# Patient Record
Sex: FEMALE | Race: BLACK OR AFRICAN AMERICAN | NOT HISPANIC OR LATINO | Employment: FULL TIME | URBAN - METROPOLITAN AREA
[De-identification: names, ages, dates, MRNs, and addresses within clinical notes are randomized per-mention and may not be internally consistent; named-entity substitution may affect disease eponyms.]

---

## 2017-01-14 ENCOUNTER — HOSPITAL ENCOUNTER (EMERGENCY)
Facility: HOSPITAL | Age: 22
Discharge: HOME | End: 2017-01-15
Attending: EMERGENCY MEDICINE | Admitting: EMERGENCY MEDICINE

## 2017-01-14 DIAGNOSIS — N83.209 RUPTURED OVARIAN CYST: Primary | ICD-10-CM

## 2017-01-14 LAB
ALBUMIN SERPL-MCNC: 3.7 G/DL (ref 3.5–5.2)
ALBUMIN/GLOB SERPL: 1.1 G/DL
ALP SERPL-CCNC: 53 U/L (ref 39–117)
ALT SERPL W P-5'-P-CCNC: 9 U/L (ref 1–33)
ANION GAP SERPL CALCULATED.3IONS-SCNC: 11.4 MMOL/L
AST SERPL-CCNC: 15 U/L (ref 1–32)
BACTERIA UR QL AUTO: ABNORMAL /HPF
BASOPHILS # BLD AUTO: 0.03 10*3/MM3 (ref 0–0.2)
BASOPHILS NFR BLD AUTO: 0.4 % (ref 0–1.5)
BILIRUB SERPL-MCNC: 0.4 MG/DL (ref 0.1–1.2)
BILIRUB UR QL STRIP: NEGATIVE
BUN BLD-MCNC: 11 MG/DL (ref 6–20)
BUN/CREAT SERPL: 11.8 (ref 7–25)
CALCIUM SPEC-SCNC: 8.7 MG/DL (ref 8.6–10.5)
CHLORIDE SERPL-SCNC: 101 MMOL/L (ref 98–107)
CLARITY UR: ABNORMAL
CO2 SERPL-SCNC: 23.6 MMOL/L (ref 22–29)
COLOR UR: YELLOW
CREAT BLD-MCNC: 0.93 MG/DL (ref 0.57–1)
DEPRECATED RDW RBC AUTO: 41.4 FL (ref 37–54)
EOSINOPHIL # BLD AUTO: 0.05 10*3/MM3 (ref 0–0.7)
EOSINOPHIL NFR BLD AUTO: 0.7 % (ref 0.3–6.2)
ERYTHROCYTE [DISTWIDTH] IN BLOOD BY AUTOMATED COUNT: 11.9 % (ref 11.7–13)
GFR SERPL CREATININE-BSD FRML MDRD: 91 ML/MIN/1.73
GLOBULIN UR ELPH-MCNC: 3.3 GM/DL
GLUCOSE BLD-MCNC: 92 MG/DL (ref 65–99)
GLUCOSE UR STRIP-MCNC: NEGATIVE MG/DL
HCG SERPL QL: NEGATIVE
HCT VFR BLD AUTO: 36.1 % (ref 35.6–45.5)
HGB BLD-MCNC: 11.9 G/DL (ref 11.9–15.5)
HGB UR QL STRIP.AUTO: NEGATIVE
HYALINE CASTS UR QL AUTO: ABNORMAL /LPF
IMM GRANULOCYTES # BLD: 0 10*3/MM3 (ref 0–0.03)
IMM GRANULOCYTES NFR BLD: 0 % (ref 0–0.5)
KETONES UR QL STRIP: NEGATIVE
LEUKOCYTE ESTERASE UR QL STRIP.AUTO: NEGATIVE
LIPASE SERPL-CCNC: 18 U/L (ref 13–60)
LYMPHOCYTES # BLD AUTO: 2.1 10*3/MM3 (ref 0.9–4.8)
LYMPHOCYTES NFR BLD AUTO: 28.6 % (ref 19.6–45.3)
MCH RBC QN AUTO: 31.6 PG (ref 26.9–32)
MCHC RBC AUTO-ENTMCNC: 33 G/DL (ref 32.4–36.3)
MCV RBC AUTO: 96 FL (ref 80.5–98.2)
MONOCYTES # BLD AUTO: 0.5 10*3/MM3 (ref 0.2–1.2)
MONOCYTES NFR BLD AUTO: 6.8 % (ref 5–12)
NEUTROPHILS # BLD AUTO: 4.67 10*3/MM3 (ref 1.9–8.1)
NEUTROPHILS NFR BLD AUTO: 63.5 % (ref 42.7–76)
NITRITE UR QL STRIP: NEGATIVE
PH UR STRIP.AUTO: 6.5 [PH] (ref 5–8)
PLATELET # BLD AUTO: 307 10*3/MM3 (ref 140–500)
PMV BLD AUTO: 9.5 FL (ref 6–12)
POTASSIUM BLD-SCNC: 3.5 MMOL/L (ref 3.5–5.2)
PROT SERPL-MCNC: 7 G/DL (ref 6–8.5)
PROT UR QL STRIP: ABNORMAL
RBC # BLD AUTO: 3.76 10*6/MM3 (ref 3.9–5.2)
RBC # UR: ABNORMAL /HPF
REF LAB TEST METHOD: ABNORMAL
SODIUM BLD-SCNC: 136 MMOL/L (ref 136–145)
SP GR UR STRIP: >=1.03 (ref 1–1.03)
SQUAMOUS #/AREA URNS HPF: ABNORMAL /HPF
UROBILINOGEN UR QL STRIP: ABNORMAL
WBC NRBC COR # BLD: 7.35 10*3/MM3 (ref 4.5–10.7)
WBC UR QL AUTO: ABNORMAL /HPF

## 2017-01-14 PROCEDURE — 99283 EMERGENCY DEPT VISIT LOW MDM: CPT

## 2017-01-14 PROCEDURE — 25010000002 ONDANSETRON PER 1 MG: Performed by: EMERGENCY MEDICINE

## 2017-01-14 PROCEDURE — 80053 COMPREHEN METABOLIC PANEL: CPT | Performed by: EMERGENCY MEDICINE

## 2017-01-14 PROCEDURE — 84703 CHORIONIC GONADOTROPIN ASSAY: CPT | Performed by: EMERGENCY MEDICINE

## 2017-01-14 PROCEDURE — 87086 URINE CULTURE/COLONY COUNT: CPT | Performed by: EMERGENCY MEDICINE

## 2017-01-14 PROCEDURE — 81001 URINALYSIS AUTO W/SCOPE: CPT | Performed by: EMERGENCY MEDICINE

## 2017-01-14 PROCEDURE — 96374 THER/PROPH/DIAG INJ IV PUSH: CPT

## 2017-01-14 PROCEDURE — 25010000002 MORPHINE PER 10 MG: Performed by: EMERGENCY MEDICINE

## 2017-01-14 PROCEDURE — 83690 ASSAY OF LIPASE: CPT | Performed by: EMERGENCY MEDICINE

## 2017-01-14 PROCEDURE — 96375 TX/PRO/DX INJ NEW DRUG ADDON: CPT

## 2017-01-14 PROCEDURE — 85025 COMPLETE CBC W/AUTO DIFF WBC: CPT | Performed by: EMERGENCY MEDICINE

## 2017-01-14 RX ORDER — ONDANSETRON 2 MG/ML
4 INJECTION INTRAMUSCULAR; INTRAVENOUS ONCE
Status: COMPLETED | OUTPATIENT
Start: 2017-01-14 | End: 2017-01-14

## 2017-01-14 RX ORDER — SODIUM CHLORIDE 0.9 % (FLUSH) 0.9 %
10 SYRINGE (ML) INJECTION AS NEEDED
Status: DISCONTINUED | OUTPATIENT
Start: 2017-01-14 | End: 2017-01-15 | Stop reason: HOSPADM

## 2017-01-14 RX ADMIN — ONDANSETRON 4 MG: 2 INJECTION INTRAMUSCULAR; INTRAVENOUS at 23:51

## 2017-01-14 RX ADMIN — MORPHINE SULFATE 4 MG: 4 INJECTION, SOLUTION INTRAMUSCULAR; INTRAVENOUS at 23:51

## 2017-01-15 ENCOUNTER — APPOINTMENT (OUTPATIENT)
Dept: CT IMAGING | Facility: HOSPITAL | Age: 22
End: 2017-01-15

## 2017-01-15 VITALS
HEIGHT: 71 IN | BODY MASS INDEX: 25.2 KG/M2 | WEIGHT: 180 LBS | TEMPERATURE: 97 F | RESPIRATION RATE: 16 BRPM | DIASTOLIC BLOOD PRESSURE: 73 MMHG | HEART RATE: 85 BPM | OXYGEN SATURATION: 100 % | SYSTOLIC BLOOD PRESSURE: 115 MMHG

## 2017-01-15 LAB
HOLD SPECIMEN: NORMAL
WHOLE BLOOD HOLD SPECIMEN: NORMAL
WHOLE BLOOD HOLD SPECIMEN: NORMAL

## 2017-01-15 PROCEDURE — 0 IOPAMIDOL 61 % SOLUTION: Performed by: EMERGENCY MEDICINE

## 2017-01-15 PROCEDURE — 74177 CT ABD & PELVIS W/CONTRAST: CPT

## 2017-01-15 RX ORDER — HYDROCODONE BITARTRATE AND ACETAMINOPHEN 5; 325 MG/1; MG/1
1 TABLET ORAL EVERY 6 HOURS PRN
Qty: 15 TABLET | Refills: 0 | Status: SHIPPED | OUTPATIENT
Start: 2017-01-15 | End: 2017-03-01

## 2017-01-15 RX ADMIN — IOPAMIDOL 85 ML: 612 INJECTION, SOLUTION INTRAVENOUS at 00:19

## 2017-01-15 NOTE — ED PROVIDER NOTES
EMERGENCY DEPARTMENT ENCOUNTER    CHIEF COMPLAINT  Chief Complaint: Abd pain  History given by: Pt  History limited by: N/A  Room Number: 23/23  PMD: No Known Provider      HPI:  Pt is a 22 y.o. female who presents complaining of severe RLQ abd pain onset 5:00 PM today. She rates her pain as 8/10 currently. Pt also c/o nausea. Pt denies vomiting, diarrhea, fever, and recent ill contacts. Her LMP ended 2 weeks ago, which was described as normal. She reports no prior abd surgeries.    Duration: 6 hours  Onset: Gradual  Timing: Constant  Location: RLQ abd  Radiation: None  Quality: Aching  Intensity/Severity: Severe  Progression: Unchanged  Associated Symptoms: Nausea  Aggravating Factors: Nothing  Alleviating Factors: Nothing  Previous Episodes: None specified  Treatment before arrival: None specified    PAST MEDICAL HISTORY  Active Ambulatory Problems     Diagnosis Date Noted   • No Active Ambulatory Problems     Resolved Ambulatory Problems     Diagnosis Date Noted   • No Resolved Ambulatory Problems     No Additional Past Medical History       PAST SURGICAL HISTORY  History reviewed. No pertinent past surgical history.    FAMILY HISTORY  History reviewed. No pertinent family history.    SOCIAL HISTORY  Social History     Social History   • Marital status: Single     Spouse name: N/A   • Number of children: N/A   • Years of education: N/A     Occupational History   • Not on file.     Social History Main Topics   • Smoking status: Never Smoker   • Smokeless tobacco: Not on file   • Alcohol use Yes      Comment: socially   • Drug use: Yes     Special: Marijuana   • Sexual activity: Not on file     Other Topics Concern   • Not on file     Social History Narrative   • No narrative on file       ALLERGIES  Review of patient's allergies indicates no known allergies.    REVIEW OF SYSTEMS  Review of Systems   Constitutional: Negative for chills and fever.   HENT: Negative for sore throat and trouble swallowing.    Eyes:  Negative for visual disturbance.   Respiratory: Negative for cough and shortness of breath.    Cardiovascular: Negative for chest pain, palpitations and leg swelling.   Gastrointestinal: Positive for abdominal pain and nausea. Negative for diarrhea and vomiting.   Endocrine: Negative.    Genitourinary: Negative for decreased urine volume, dysuria and frequency.   Musculoskeletal: Negative for neck pain.   Skin: Negative for rash.   Allergic/Immunologic: Negative.    Neurological: Negative for syncope, weakness, numbness and headaches.   Hematological: Negative.    Psychiatric/Behavioral: Negative.    All other systems reviewed and are negative.      PHYSICAL EXAM  ED Triage Vitals   Temp Heart Rate Resp BP SpO2   01/14/17 2230 01/14/17 2230 01/14/17 2230 01/14/17 2235 01/14/17 2230   98.5 °F (36.9 °C) 108 16 133/79 98 %      Temp src Heart Rate Source Patient Position BP Location FiO2 (%)   01/14/17 2230 01/14/17 2230 -- -- --   Tympanic Monitor          Physical Exam   Constitutional: She is oriented to person, place, and time and well-developed, well-nourished, and in no distress. No distress.   HENT:   Head: Normocephalic and atraumatic.   Eyes: EOM are normal. Pupils are equal, round, and reactive to light.   Neck: Normal range of motion. Neck supple.   Cardiovascular: Normal rate, regular rhythm and normal heart sounds.    Pulmonary/Chest: Effort normal and breath sounds normal. No respiratory distress.   Abdominal: Soft. She exhibits no mass. There is tenderness (moderate) in the right lower quadrant. There is no rebound and no guarding.   Musculoskeletal: Normal range of motion. She exhibits no edema.   Pt's extremities without deformity   Neurological: She is alert and oriented to person, place, and time. She has normal sensation and normal strength.   Normal neuro exam   Skin: Skin is warm and dry. No rash noted.   Psychiatric: Mood and affect normal.   Nursing note and vitals reviewed.      LAB  RESULTS  Lab Results (last 24 hours)     Procedure Component Value Units Date/Time    Urinalysis With / Culture If Indicated [[id number]]  (Abnormal) Collected:  01/14/17 2241    Specimen:  Urine from Urine, Clean Catch Updated:  01/14/17 2250     Color, UA Yellow      Appearance, UA Cloudy (A)      pH, UA 6.5      Specific Gravity, UA >=1.030      Glucose, UA Negative      Ketones, UA Negative      Bilirubin, UA Negative      Blood, UA Negative      Protein, UA 30 mg/dL (1+) (A)      Leuk Esterase, UA Negative      Nitrite, UA Negative      Urobilinogen, UA 0.2 E.U./dL     Urinalysis, Microscopic Only [[id number]]  (Abnormal) Collected:  01/14/17 2241    Specimen:  Urine from Urine, Clean Catch Updated:  01/14/17 2300     RBC, UA 0-2 (A) /HPF      WBC, UA 13-20 (A) /HPF      Bacteria, UA 4+ (A) /HPF      Squamous Epithelial Cells, UA Too Numerous to Count (A) /HPF      Hyaline Casts, UA 0-2 /LPF      Methodology Manual Light Microscopy     Urine Culture [[id number]] Collected:  01/14/17 2241    Specimen:  Urine from Urine, Clean Catch Updated:  01/14/17 2254    CBC & Differential [[id number]] Collected:  01/14/17 2311    Specimen:  Blood Updated:  01/14/17 2330    Narrative:       The following orders were created for panel order CBC & Differential.  Procedure                               Abnormality         Status                     ---------                               -----------         ------                     CBC Auto Differential[[id number]]         Abnormal            Final result                 Please view results for these tests on the individual orders.    Comprehensive Metabolic Panel [[id number]] Collected:  01/14/17 2311    Specimen:  Blood Updated:  01/14/17 2354     Glucose 92 mg/dL      BUN 11 mg/dL      Creatinine 0.93 mg/dL      Sodium 136 mmol/L      Potassium 3.5 mmol/L      Chloride 101 mmol/L      CO2 23.6 mmol/L      Calcium 8.7 mg/dL      Total Protein 7.0 g/dL      Albumin 3.70 g/dL      ALT  (SGPT) 9 U/L      AST (SGOT) 15 U/L      Alkaline Phosphatase 53 U/L      Total Bilirubin 0.4 mg/dL      eGFR  African Amer 91 mL/min/1.73      Globulin 3.3 gm/dL      A/G Ratio 1.1 g/dL      BUN/Creatinine Ratio 11.8      Anion Gap 11.4 mmol/L     Lipase [[id number]]  (Normal) Collected:  01/14/17 2311    Specimen:  Blood Updated:  01/14/17 2354     Lipase 18 U/L     hCG, Serum, Qualitative [[id number]]  (Normal) Collected:  01/14/17 2311    Specimen:  Blood Updated:  01/14/17 2349     HCG Qualitative Negative     CBC Auto Differential [[id number]]  (Abnormal) Collected:  01/14/17 2311    Specimen:  Blood Updated:  01/14/17 2330     WBC 7.35 10*3/mm3      RBC 3.76 (L) 10*6/mm3      Hemoglobin 11.9 g/dL      Hematocrit 36.1 %      MCV 96.0 fL      MCH 31.6 pg      MCHC 33.0 g/dL      RDW 11.9 %      RDW-SD 41.4 fl      MPV 9.5 fL      Platelets 307 10*3/mm3      Neutrophil % 63.5 %      Lymphocyte % 28.6 %      Monocyte % 6.8 %      Eosinophil % 0.7 %      Basophil % 0.4 %      Immature Grans % 0.0 %      Neutrophils, Absolute 4.67 10*3/mm3      Lymphocytes, Absolute 2.10 10*3/mm3      Monocytes, Absolute 0.50 10*3/mm3      Eosinophils, Absolute 0.05 10*3/mm3      Basophils, Absolute 0.03 10*3/mm3      Immature Grans, Absolute 0.00 10*3/mm3           I ordered the above labs and reviewed the results    RADIOLOGY  CT Abdomen Pelvis With Contrast    (Results Pending)   CT Abd Pelvis:  Small amount of free fluid in pelvis, normal appendix     I ordered the above noted radiological studies. Interpreted by radiologist. Discussed with radiologist (Dr. Haji). Reviewed by me in PACS.     PROCEDURES  Procedures      PROGRESS AND CONSULTS  ED Course   11:21 PM:  Vitals: BP: 127/79 HR: 90 Temp: 98.5 °F (36.9 °C) (Tympanic) O2 sat: 99%  D/w pt plan for labs and CT Abd Pelvis for further evaluation. Ordered Morphine and Zofran for pain and nausea. Pt understands and agrees with the plan, all questions answered.    12:41  AM:  Vitals: BP: 120/78 HR: 90 Temp: 98.5 °F (36.9 °C) (Tympanic) O2 sat: 100%  Rechecked pt. Pt is resting comfortably. Discussed results of imaging, which showed free fluid in the pelvis evident of a ruptured cyst, and the plan for discharge. Advised the pt to f/u with GYN for further care. Will provide the pt a prescription for Norco. Pt understands and agrees with the plan, all questions answered.    MEDICAL DECISION MAKING  Results were reviewed/discussed with the patient and they were also made aware of online access. Pt also made aware that some labs, such as cultures, will not be resulted during ER visit and follow up with PMD is necessary.     MDM  Number of Diagnoses or Management Options     Amount and/or Complexity of Data Reviewed  Clinical lab tests: ordered and reviewed  Tests in the radiology section of CPT®: ordered and reviewed  Discussion of test results with the performing providers: yes  Independent visualization of images, tracings, or specimens: yes    Patient Progress  Patient progress: stable         DIAGNOSIS  Final diagnoses:   Ruptured ovarian cyst       DISPOSITION  DISCHARGE    Patient discharged in stable condition.    Reviewed implications of results, diagnosis, meds, responsibility to follow up, warning signs and symptoms of possible worsening, potential complications and reasons to return to ER, including any new or worsening symptoms.    Patient/Family voiced understanding of above instructions.    Discussed plan for discharge, as there is no emergent indication for admission.  Pt/family is agreeable and understands need for follow up and repeat testing.  Pt is aware that discharge does not mean that nothing is wrong but it indicates no emergency is present that requires admission and they must continue care with follow-up as given below or physician of their choice.     FOLLOW-UP  Latonia Hernández MD  [address] [facility] [address] [phone]    Schedule an appointment as  soon as possible for a visit           Medication List      New Prescriptions          HYDROcodone-acetaminophen 5-325 MG per tablet   Commonly known as:  NORCO   Take 1 tablet by mouth Every 6 (Six) Hours As Needed for moderate pain   (4-6).           Latest Documented Vital Signs:  As of 12:43 AM  BP- 120/78 HR- 90 Temp- 98.5 °F (36.9 °C) (Tympanic) O2 sat- 100%    --  Documentation assistance provided by oneal Hoover for Dr. Gibbs.  Information recorded by the scribe was done at my direction and has been verified and validated by me.     Augie Hoover  01/15/17 0043       Christiano Gibbs MD  01/15/17 0114

## 2017-01-15 NOTE — ED NOTES
Pt reports RLQ pain since 1500 today, also reports diarrhea today.      Pt NPO solids and liquids 1700.     Nitza Bolton RN  01/14/17 8196

## 2017-01-16 LAB — BACTERIA SPEC AEROBE CULT: NORMAL

## 2017-03-01 ENCOUNTER — APPOINTMENT (OUTPATIENT)
Dept: PREADMISSION TESTING | Facility: HOSPITAL | Age: 22
End: 2017-03-01

## 2017-03-01 VITALS
RESPIRATION RATE: 20 BRPM | TEMPERATURE: 98.4 F | DIASTOLIC BLOOD PRESSURE: 89 MMHG | WEIGHT: 175 LBS | HEIGHT: 72 IN | OXYGEN SATURATION: 100 % | BODY MASS INDEX: 23.7 KG/M2 | HEART RATE: 67 BPM | SYSTOLIC BLOOD PRESSURE: 141 MMHG

## 2017-03-01 LAB
BASOPHILS # BLD AUTO: 0.04 10*3/MM3 (ref 0–0.2)
BASOPHILS NFR BLD AUTO: 1 % (ref 0–1.5)
DEPRECATED RDW RBC AUTO: 42.7 FL (ref 37–54)
EOSINOPHIL # BLD AUTO: 0.15 10*3/MM3 (ref 0–0.7)
EOSINOPHIL NFR BLD AUTO: 3.6 % (ref 0.3–6.2)
ERYTHROCYTE [DISTWIDTH] IN BLOOD BY AUTOMATED COUNT: 12.6 % (ref 11.7–13)
HCG SERPL QL: NEGATIVE
HCT VFR BLD AUTO: 36.5 % (ref 35.6–45.5)
HGB BLD-MCNC: 12.3 G/DL (ref 11.9–15.5)
IMM GRANULOCYTES # BLD: 0 10*3/MM3 (ref 0–0.03)
IMM GRANULOCYTES NFR BLD: 0 % (ref 0–0.5)
LYMPHOCYTES # BLD AUTO: 1.78 10*3/MM3 (ref 0.9–4.8)
LYMPHOCYTES NFR BLD AUTO: 42.8 % (ref 19.6–45.3)
MCH RBC QN AUTO: 31.8 PG (ref 26.9–32)
MCHC RBC AUTO-ENTMCNC: 33.7 G/DL (ref 32.4–36.3)
MCV RBC AUTO: 94.3 FL (ref 80.5–98.2)
MONOCYTES # BLD AUTO: 0.21 10*3/MM3 (ref 0.2–1.2)
MONOCYTES NFR BLD AUTO: 5 % (ref 5–12)
NEUTROPHILS # BLD AUTO: 1.98 10*3/MM3 (ref 1.9–8.1)
NEUTROPHILS NFR BLD AUTO: 47.6 % (ref 42.7–76)
PLATELET # BLD AUTO: 301 10*3/MM3 (ref 140–500)
PMV BLD AUTO: 9.9 FL (ref 6–12)
RBC # BLD AUTO: 3.87 10*6/MM3 (ref 3.9–5.2)
WBC NRBC COR # BLD: 4.16 10*3/MM3 (ref 4.5–10.7)

## 2017-03-01 PROCEDURE — 36415 COLL VENOUS BLD VENIPUNCTURE: CPT

## 2017-03-01 PROCEDURE — 84703 CHORIONIC GONADOTROPIN ASSAY: CPT | Performed by: OBSTETRICS & GYNECOLOGY

## 2017-03-01 PROCEDURE — 85025 COMPLETE CBC W/AUTO DIFF WBC: CPT | Performed by: OBSTETRICS & GYNECOLOGY

## 2017-03-01 RX ORDER — LEVONORGESTREL AND ETHINYL ESTRADIOL 0.1-0.02MG
1 KIT ORAL DAILY
COMMUNITY

## 2017-03-01 NOTE — DISCHARGE INSTRUCTIONS
Take the following medications the morning of surgery with a small sip of water:  NONE    ARRIVAL TIME 0545        General Instructions:  • Do not eat or drink after midnight: includes water, mints, or gum. You may brush your teeth.  • Do not smoke, chew tobacco, or drink alcohol.  • The Pre-Admission Testing nurse will instruct you to bring medications if unable to obtain an accurate list in Pre-Admission Testing.    • If applicable bring your C-PAP/ BI-PAP machine.  • Bring any papers given to you in the doctor’s office.  • Wear clean comfortable clothes and socks.  • Do not wear contact lenses or make-up.  Bring a case for your glasses if applicable.   • Bring crutches or walker if applicable.  • Leave all other valuables and jewelry at home.        Preventing a Surgical Site Infection:  Shower on the morning of surgery using a fresh bar of anti-bacterial soap (such as Dial) and clean washcloth.  Dry with a clean towel and dress in clean clothing.  For 2 to 3 days before surgery, avoid shaving with a razor near where you will have surgery because the razor can irritate skin and make it easier to develop an infection  Ask your surgeon if you will be receiving antibiotics prior to surgery  Make sure you, your family, and all healthcare providers clean their hands with soap and water or an alcohol based hand  before caring for you or your wound  If at all possible, quit smoking as many days before surgery as you can.    Day of surgery:  Upon arrival, a Pre-op nurse and Anesthesiologist will review your health history, obtain vital signs, and answer questions you may have.  The only belongings needed at this time will be your home medications and if applicable your C-PAP/BI-PAP machine.  If you are staying overnight your family can leave the rest of your belongings in the car and bring them to your room later.  A Pre-op nurse will start an IV and you may receive medication in preparation for surgery,  including something to help you relax.  Your family will be able to see you in the Pre-op area.  While you are in surgery your family should notify the waiting room  if they leave the waiting room area and provide a contact phone number.    Please be aware that surgery does come with discomfort.  We want to make every effort to control your discomfort so please discuss any uncontrolled symptoms with your nurse.   Your doctor will most likely have prescribed pain medications.      If you are going home after surgery you will receive individualized written care instructions before being discharged.  A responsible adult must drive you to and from the hospital on the day of your surgery and stay with you for 24 hours.    If you are staying overnight following surgery, you will be transported to your hospital room following the recovery period.  [facility].    If you have any questions please call Pre-Admission Testing at [phone].  Deductibles and co-payments are collected on the day of service. Please be prepared to pay the required co-pay, deductible or deposit on the day of service as defined by your plan.

## 2017-03-06 ENCOUNTER — ANESTHESIA EVENT (OUTPATIENT)
Dept: PERIOP | Facility: HOSPITAL | Age: 22
End: 2017-03-06

## 2017-03-06 ENCOUNTER — HOSPITAL ENCOUNTER (OUTPATIENT)
Facility: HOSPITAL | Age: 22
Setting detail: HOSPITAL OUTPATIENT SURGERY
Discharge: HOME | End: 2017-03-06
Attending: OBSTETRICS & GYNECOLOGY | Admitting: OBSTETRICS & GYNECOLOGY

## 2017-03-06 ENCOUNTER — ANESTHESIA (OUTPATIENT)
Dept: PERIOP | Facility: HOSPITAL | Age: 22
End: 2017-03-06

## 2017-03-06 VITALS
TEMPERATURE: 97.8 F | DIASTOLIC BLOOD PRESSURE: 103 MMHG | WEIGHT: 175 LBS | OXYGEN SATURATION: 95 % | RESPIRATION RATE: 16 BRPM | BODY MASS INDEX: 23.09 KG/M2 | HEART RATE: 66 BPM | SYSTOLIC BLOOD PRESSURE: 141 MMHG

## 2017-03-06 PROBLEM — R10.2 PELVIC PAIN IN FEMALE: Status: ACTIVE | Noted: 2017-03-06

## 2017-03-06 PROBLEM — N83.299 COMPLEX OVARIAN CYST: Status: ACTIVE | Noted: 2017-03-06

## 2017-03-06 PROCEDURE — 25010000002 KETOROLAC TROMETHAMINE PER 15 MG: Performed by: NURSE ANESTHETIST, CERTIFIED REGISTERED

## 2017-03-06 PROCEDURE — 25010000002 PROPOFOL 10 MG/ML EMULSION: Performed by: NURSE ANESTHETIST, CERTIFIED REGISTERED

## 2017-03-06 PROCEDURE — 25010000002 ROPIVACAINE PER 1 MG: Performed by: OBSTETRICS & GYNECOLOGY

## 2017-03-06 PROCEDURE — 25010000002 ONDANSETRON PER 1 MG: Performed by: NURSE ANESTHETIST, CERTIFIED REGISTERED

## 2017-03-06 PROCEDURE — 25010000002 FENTANYL CITRATE (PF) 100 MCG/2ML SOLUTION: Performed by: NURSE ANESTHETIST, CERTIFIED REGISTERED

## 2017-03-06 PROCEDURE — 25010000002 HYDROMORPHONE PER 4 MG: Performed by: NURSE ANESTHETIST, CERTIFIED REGISTERED

## 2017-03-06 PROCEDURE — 25010000002 PROMETHAZINE PER 50 MG: Performed by: ANESTHESIOLOGY

## 2017-03-06 PROCEDURE — 25010000002 NEOSTIGMINE 10 MG/10ML SOLUTION: Performed by: NURSE ANESTHETIST, CERTIFIED REGISTERED

## 2017-03-06 PROCEDURE — 25010000002 MIDAZOLAM PER 1 MG: Performed by: ANESTHESIOLOGY

## 2017-03-06 PROCEDURE — 25010000002 DEXAMETHASONE PER 1 MG: Performed by: NURSE ANESTHETIST, CERTIFIED REGISTERED

## 2017-03-06 RX ORDER — PROMETHAZINE HYDROCHLORIDE 25 MG/ML
12.5 INJECTION, SOLUTION INTRAMUSCULAR; INTRAVENOUS ONCE AS NEEDED
Status: DISCONTINUED | OUTPATIENT
Start: 2017-03-06 | End: 2017-03-06 | Stop reason: HOSPADM

## 2017-03-06 RX ORDER — NEOSTIGMINE METHYLSULFATE 1 MG/ML
INJECTION, SOLUTION INTRAVENOUS AS NEEDED
Status: DISCONTINUED | OUTPATIENT
Start: 2017-03-06 | End: 2017-03-06 | Stop reason: SURG

## 2017-03-06 RX ORDER — MAGNESIUM HYDROXIDE 1200 MG/15ML
LIQUID ORAL AS NEEDED
Status: DISCONTINUED | OUTPATIENT
Start: 2017-03-06 | End: 2017-03-06 | Stop reason: HOSPADM

## 2017-03-06 RX ORDER — PROPOFOL 10 MG/ML
VIAL (ML) INTRAVENOUS AS NEEDED
Status: DISCONTINUED | OUTPATIENT
Start: 2017-03-06 | End: 2017-03-06 | Stop reason: SURG

## 2017-03-06 RX ORDER — FLUMAZENIL 0.1 MG/ML
0.2 INJECTION INTRAVENOUS AS NEEDED
Status: DISCONTINUED | OUTPATIENT
Start: 2017-03-06 | End: 2017-03-06 | Stop reason: HOSPADM

## 2017-03-06 RX ORDER — MIDAZOLAM HYDROCHLORIDE 1 MG/ML
1 INJECTION INTRAMUSCULAR; INTRAVENOUS
Status: DISCONTINUED | OUTPATIENT
Start: 2017-03-06 | End: 2017-03-06 | Stop reason: HOSPADM

## 2017-03-06 RX ORDER — FENTANYL CITRATE 50 UG/ML
50 INJECTION, SOLUTION INTRAMUSCULAR; INTRAVENOUS
Status: DISCONTINUED | OUTPATIENT
Start: 2017-03-06 | End: 2017-03-06 | Stop reason: HOSPADM

## 2017-03-06 RX ORDER — ROPIVACAINE HYDROCHLORIDE 5 MG/ML
INJECTION, SOLUTION EPIDURAL; INFILTRATION; PERINEURAL AS NEEDED
Status: DISCONTINUED | OUTPATIENT
Start: 2017-03-06 | End: 2017-03-06 | Stop reason: HOSPADM

## 2017-03-06 RX ORDER — DIPHENHYDRAMINE HYDROCHLORIDE 50 MG/ML
12.5 INJECTION INTRAMUSCULAR; INTRAVENOUS
Status: DISCONTINUED | OUTPATIENT
Start: 2017-03-06 | End: 2017-03-06 | Stop reason: HOSPADM

## 2017-03-06 RX ORDER — PROMETHAZINE HYDROCHLORIDE 25 MG/1
25 TABLET ORAL ONCE AS NEEDED
Status: DISCONTINUED | OUTPATIENT
Start: 2017-03-06 | End: 2017-03-06 | Stop reason: HOSPADM

## 2017-03-06 RX ORDER — HYDROMORPHONE HYDROCHLORIDE 1 MG/ML
0.5 INJECTION, SOLUTION INTRAMUSCULAR; INTRAVENOUS; SUBCUTANEOUS
Status: DISCONTINUED | OUTPATIENT
Start: 2017-03-06 | End: 2017-03-06 | Stop reason: HOSPADM

## 2017-03-06 RX ORDER — FENTANYL CITRATE 50 UG/ML
INJECTION, SOLUTION INTRAMUSCULAR; INTRAVENOUS AS NEEDED
Status: DISCONTINUED | OUTPATIENT
Start: 2017-03-06 | End: 2017-03-06 | Stop reason: SURG

## 2017-03-06 RX ORDER — LIDOCAINE HYDROCHLORIDE 20 MG/ML
INJECTION, SOLUTION INFILTRATION; PERINEURAL AS NEEDED
Status: DISCONTINUED | OUTPATIENT
Start: 2017-03-06 | End: 2017-03-06 | Stop reason: SURG

## 2017-03-06 RX ORDER — ONDANSETRON 2 MG/ML
INJECTION INTRAMUSCULAR; INTRAVENOUS AS NEEDED
Status: DISCONTINUED | OUTPATIENT
Start: 2017-03-06 | End: 2017-03-06 | Stop reason: SURG

## 2017-03-06 RX ORDER — FAMOTIDINE 10 MG/ML
20 INJECTION, SOLUTION INTRAVENOUS ONCE
Status: COMPLETED | OUTPATIENT
Start: 2017-03-06 | End: 2017-03-06

## 2017-03-06 RX ORDER — OXYCODONE AND ACETAMINOPHEN 7.5; 325 MG/1; MG/1
1 TABLET ORAL ONCE AS NEEDED
Status: DISCONTINUED | OUTPATIENT
Start: 2017-03-06 | End: 2017-03-06 | Stop reason: HOSPADM

## 2017-03-06 RX ORDER — ROCURONIUM BROMIDE 10 MG/ML
INJECTION, SOLUTION INTRAVENOUS AS NEEDED
Status: DISCONTINUED | OUTPATIENT
Start: 2017-03-06 | End: 2017-03-06 | Stop reason: SURG

## 2017-03-06 RX ORDER — SODIUM CHLORIDE 0.9 % (FLUSH) 0.9 %
1-10 SYRINGE (ML) INJECTION AS NEEDED
Status: DISCONTINUED | OUTPATIENT
Start: 2017-03-06 | End: 2017-03-06 | Stop reason: HOSPADM

## 2017-03-06 RX ORDER — MIDAZOLAM HYDROCHLORIDE 1 MG/ML
2 INJECTION INTRAMUSCULAR; INTRAVENOUS
Status: DISCONTINUED | OUTPATIENT
Start: 2017-03-06 | End: 2017-03-06 | Stop reason: HOSPADM

## 2017-03-06 RX ORDER — SODIUM CHLORIDE, SODIUM LACTATE, POTASSIUM CHLORIDE, CALCIUM CHLORIDE 600; 310; 30; 20 MG/100ML; MG/100ML; MG/100ML; MG/100ML
9 INJECTION, SOLUTION INTRAVENOUS CONTINUOUS
Status: DISCONTINUED | OUTPATIENT
Start: 2017-03-06 | End: 2017-03-06 | Stop reason: HOSPADM

## 2017-03-06 RX ORDER — ONDANSETRON 2 MG/ML
4 INJECTION INTRAMUSCULAR; INTRAVENOUS ONCE AS NEEDED
Status: DISCONTINUED | OUTPATIENT
Start: 2017-03-06 | End: 2017-03-06 | Stop reason: HOSPADM

## 2017-03-06 RX ORDER — PROMETHAZINE HYDROCHLORIDE 25 MG/1
12.5 TABLET ORAL ONCE AS NEEDED
Status: DISCONTINUED | OUTPATIENT
Start: 2017-03-06 | End: 2017-03-06 | Stop reason: HOSPADM

## 2017-03-06 RX ORDER — SODIUM CHLORIDE 9 MG/ML
INJECTION, SOLUTION INTRAVENOUS AS NEEDED
Status: DISCONTINUED | OUTPATIENT
Start: 2017-03-06 | End: 2017-03-06 | Stop reason: HOSPADM

## 2017-03-06 RX ORDER — SCOLOPAMINE TRANSDERMAL SYSTEM 1 MG/1
1 PATCH, EXTENDED RELEASE TRANSDERMAL ONCE
Status: DISCONTINUED | OUTPATIENT
Start: 2017-03-06 | End: 2017-03-06 | Stop reason: HOSPADM

## 2017-03-06 RX ORDER — HYDROCODONE BITARTRATE AND ACETAMINOPHEN 7.5; 325 MG/1; MG/1
1 TABLET ORAL ONCE AS NEEDED
Status: COMPLETED | OUTPATIENT
Start: 2017-03-06 | End: 2017-03-06

## 2017-03-06 RX ORDER — PROMETHAZINE HYDROCHLORIDE 25 MG/ML
6.25 INJECTION, SOLUTION INTRAMUSCULAR; INTRAVENOUS ONCE
Status: COMPLETED | OUTPATIENT
Start: 2017-03-06 | End: 2017-03-06

## 2017-03-06 RX ORDER — GLYCOPYRROLATE 0.2 MG/ML
INJECTION INTRAMUSCULAR; INTRAVENOUS AS NEEDED
Status: DISCONTINUED | OUTPATIENT
Start: 2017-03-06 | End: 2017-03-06 | Stop reason: SURG

## 2017-03-06 RX ORDER — KETOROLAC TROMETHAMINE 30 MG/ML
INJECTION, SOLUTION INTRAMUSCULAR; INTRAVENOUS AS NEEDED
Status: DISCONTINUED | OUTPATIENT
Start: 2017-03-06 | End: 2017-03-06 | Stop reason: SURG

## 2017-03-06 RX ORDER — LIDOCAINE HYDROCHLORIDE 10 MG/ML
5 INJECTION, SOLUTION EPIDURAL; INFILTRATION; INTRACAUDAL; PERINEURAL ONCE AS NEEDED
Status: COMPLETED | OUTPATIENT
Start: 2017-03-06 | End: 2017-03-06

## 2017-03-06 RX ORDER — PROMETHAZINE HYDROCHLORIDE 25 MG/1
25 SUPPOSITORY RECTAL ONCE AS NEEDED
Status: DISCONTINUED | OUTPATIENT
Start: 2017-03-06 | End: 2017-03-06 | Stop reason: HOSPADM

## 2017-03-06 RX ORDER — LABETALOL HYDROCHLORIDE 5 MG/ML
5 INJECTION, SOLUTION INTRAVENOUS
Status: DISCONTINUED | OUTPATIENT
Start: 2017-03-06 | End: 2017-03-06 | Stop reason: HOSPADM

## 2017-03-06 RX ORDER — NALOXONE HCL 0.4 MG/ML
0.2 VIAL (ML) INJECTION AS NEEDED
Status: DISCONTINUED | OUTPATIENT
Start: 2017-03-06 | End: 2017-03-06 | Stop reason: HOSPADM

## 2017-03-06 RX ORDER — DEXAMETHASONE SODIUM PHOSPHATE 10 MG/ML
INJECTION INTRAMUSCULAR; INTRAVENOUS AS NEEDED
Status: DISCONTINUED | OUTPATIENT
Start: 2017-03-06 | End: 2017-03-06 | Stop reason: SURG

## 2017-03-06 RX ORDER — HYDRALAZINE HYDROCHLORIDE 20 MG/ML
5 INJECTION INTRAMUSCULAR; INTRAVENOUS
Status: DISCONTINUED | OUTPATIENT
Start: 2017-03-06 | End: 2017-03-06 | Stop reason: HOSPADM

## 2017-03-06 RX ORDER — HYDROMORPHONE HCL 110MG/55ML
PATIENT CONTROLLED ANALGESIA SYRINGE INTRAVENOUS AS NEEDED
Status: DISCONTINUED | OUTPATIENT
Start: 2017-03-06 | End: 2017-03-06 | Stop reason: SURG

## 2017-03-06 RX ORDER — FENTANYL CITRATE 50 UG/ML
100 INJECTION, SOLUTION INTRAMUSCULAR; INTRAVENOUS
Status: DISCONTINUED | OUTPATIENT
Start: 2017-03-06 | End: 2017-03-06 | Stop reason: HOSPADM

## 2017-03-06 RX ADMIN — PROPOFOL 200 MG: 10 INJECTION, EMULSION INTRAVENOUS at 07:02

## 2017-03-06 RX ADMIN — SODIUM CHLORIDE, POTASSIUM CHLORIDE, SODIUM LACTATE AND CALCIUM CHLORIDE 9 ML/HR: 600; 310; 30; 20 INJECTION, SOLUTION INTRAVENOUS at 06:21

## 2017-03-06 RX ADMIN — MIDAZOLAM 2 MG: 1 INJECTION INTRAMUSCULAR; INTRAVENOUS at 06:49

## 2017-03-06 RX ADMIN — PROMETHAZINE HYDROCHLORIDE 6.25 MG: 25 INJECTION INTRAMUSCULAR; INTRAVENOUS at 06:49

## 2017-03-06 RX ADMIN — SODIUM CHLORIDE, POTASSIUM CHLORIDE, SODIUM LACTATE AND CALCIUM CHLORIDE: 600; 310; 30; 20 INJECTION, SOLUTION INTRAVENOUS at 07:38

## 2017-03-06 RX ADMIN — NEOSTIGMINE METHYLSULFATE 2 MG: 1 INJECTION INTRAVENOUS at 07:42

## 2017-03-06 RX ADMIN — ROCURONIUM BROMIDE 30 MG: 10 INJECTION INTRAVENOUS at 07:02

## 2017-03-06 RX ADMIN — SCOPALAMINE 1 PATCH: 1 PATCH, EXTENDED RELEASE TRANSDERMAL at 06:49

## 2017-03-06 RX ADMIN — HYDROMORPHONE HYDROCHLORIDE 1 MG: 2 INJECTION, SOLUTION INTRAMUSCULAR; INTRAVENOUS; SUBCUTANEOUS at 07:24

## 2017-03-06 RX ADMIN — DEXAMETHASONE SODIUM PHOSPHATE 8 MG: 10 INJECTION INTRAMUSCULAR; INTRAVENOUS at 07:11

## 2017-03-06 RX ADMIN — LIDOCAINE HYDROCHLORIDE 0.2 ML: 10 INJECTION, SOLUTION EPIDURAL; INFILTRATION; INTRACAUDAL; PERINEURAL at 06:21

## 2017-03-06 RX ADMIN — HYDROCODONE BITARTRATE AND ACETAMINOPHEN 1 TABLET: 7.5; 325 TABLET ORAL at 08:44

## 2017-03-06 RX ADMIN — ONDANSETRON 4 MG: 2 INJECTION INTRAMUSCULAR; INTRAVENOUS at 07:11

## 2017-03-06 RX ADMIN — NEOSTIGMINE METHYLSULFATE 3 MG: 1 INJECTION INTRAVENOUS at 07:35

## 2017-03-06 RX ADMIN — KETOROLAC TROMETHAMINE 30 MG: 30 INJECTION, SOLUTION INTRAMUSCULAR; INTRAVENOUS at 07:32

## 2017-03-06 RX ADMIN — GLYCOPYRROLATE 0.2 MG: 0.2 INJECTION INTRAMUSCULAR; INTRAVENOUS at 07:42

## 2017-03-06 RX ADMIN — LIDOCAINE HYDROCHLORIDE 100 MG: 20 INJECTION, SOLUTION INFILTRATION; PERINEURAL at 07:02

## 2017-03-06 RX ADMIN — GLYCOPYRROLATE 0.4 MG: 0.2 INJECTION INTRAMUSCULAR; INTRAVENOUS at 07:35

## 2017-03-06 RX ADMIN — FAMOTIDINE 20 MG: 10 INJECTION, SOLUTION INTRAVENOUS at 06:49

## 2017-03-06 RX ADMIN — FENTANYL CITRATE 100 MCG: 50 INJECTION INTRAMUSCULAR; INTRAVENOUS at 07:02

## 2017-03-06 NOTE — ANESTHESIA PREPROCEDURE EVALUATION
Anesthesia Evaluation     Patient summary reviewed and Nursing notes reviewed   NPO Status: > 8 hours   Airway   Mallampati: II  TM distance: >3 FB  Neck ROM: full  no difficulty expected  Dental - normal exam     Pulmonary - negative pulmonary ROS and normal exam   Cardiovascular - negative cardio ROS and normal exam        Neuro/Psych- negative ROS  GI/Hepatic/Renal/Endo - negative ROS     Musculoskeletal (-) negative ROS    Abdominal  - normal exam   Substance History - negative use     OB/GYN negative ob/gyn ROS         Other                                    Anesthesia Plan    ASA 2     general     intravenous induction   Anesthetic plan and risks discussed with patient.    Plan discussed with CRNA.

## 2017-03-06 NOTE — H&P
HISTORY OF PRESENT ILLNESS: This patient is a 22-year-old female with worsening problems with very significant pelvic pain. Ultrasound confirmed a complex ovarian cyst, but it was only 2-3 cm in diameter. The pain has persisted and has required daily narcotics for regular functioning. Because of this, the concern is for endometriosis and we will pursue diagnostic laparoscopy and cautery of any endometriosis.     ALLERGIES: No known allergies.     PAST SURGICAL HISTORY: None.     CURRENT MEDICATIONS:   1. Birth control 1/20.  2. Percocet 5/325 mg p.r.n.   3. Ibuprofen 800 mg p.r.n.     PHYSICAL EXAMINATION:  VITAL SIGNS: Blood pressure is 110/70. Weight 176 pounds at 6 feet 3 inches for a body mass index of 23.   LUNGS: Clear.   HEART: Regular rate without murmur.   ABDOMEN: Soft. Tender in the lower quadrants.   PELVIC: Normal external genitalia. Cervix is nulliparous. Uterus is tender with manipulation. Both adnexal regions are tender. No mass can be appreciated. Rectovaginal is confirmatory.     IMPRESSION: Persisted pelvic pain requiring narcotics for pain control, concern for endometriosis.     PLAN: Laparoscopy with cautery of any endometriosis. The patient understands all of the risks and benefits of surgery. She understands the risk of bleeding, infection, injury to bowel or bladder. She accepts these risks and wishes to proceed.         Marian Nettles M.D. MC:barney  D:   03/05/2017 18:50:56  T:   03/05/2017 21:44:45  Job ID:   [id number]  Document ID:   [id number]  cc:     (n)

## 2017-03-06 NOTE — ANESTHESIA PROCEDURE NOTES
Airway  Urgency: elective    Airway not difficult    General Information and Staff    Patient location during procedure: OR  Anesthesiologist: RENDER, MAXIMUS RAY  CRNA: KENZIE RUBI    Indications and Patient Condition  Indications for airway management: airway protection    Preoxygenated: yes  Mask difficulty assessment: 2 - vent by mask + OA or adjuvant +/- NMBA    Final Airway Details  Final airway type: endotracheal airway      Successful airway: ETT  Cuffed: yes   Successful intubation technique: direct laryngoscopy  Endotracheal tube insertion site: oral  Blade: Saul  Blade size: #2  ETT size: 7.0 mm  Cormack-Lehane Classification: grade I - full view of glottis  Placement verified by: chest auscultation and capnometry   Cuff volume (mL): 8  Number of attempts at approach: 1    Additional Comments  PreO2 with 100% O2;  FeO2 >85%;  sniff position; easy mask ventilation;  Intubated with no difficultly after eyes taped; Appears atraumatic;  Lips and teeth intact as preop condition;  Airway secured. Connected to ventilator.

## 2017-03-06 NOTE — PLAN OF CARE
Problem: Patient Care Overview (Adult)  Goal: Plan of Care Review  Outcome: Outcome(s) achieved Date Met:  03/06/17  Goal: Discharge Needs Assessment  Outcome: Outcome(s) achieved Date Met:  03/06/17    Problem: Perioperative Period (Adult)  Goal: Signs and Symptoms of Listed Potential Problems Will be Absent or Manageable (Perioperative Period)  Outcome: Outcome(s) achieved Date Met:  03/06/17

## 2017-03-06 NOTE — ANESTHESIA POSTPROCEDURE EVALUATION
Patient: Timothy Alarcon    Procedure Summary     Date Anesthesia Start Anesthesia Stop Room / Location    03/06/17 0659 0752  [facility]  /  [facility]       Procedure Diagnosis Surgeon Provider    DIAGNOSTIC LAPAROSCOPY WITH DRAINAGE OF RIGHT OVARIAN CYST (N/A Abdomen) No diagnosis on file. MD Vahid Akbar MD          Anesthesia Type: general  Last vitals  BP (!) 141/103 (03/06/17 0852)    Temp      Pulse 66 (03/06/17 0852)   Resp 16 (03/06/17 0852)    SpO2 95 % (03/06/17 0852)      Post Anesthesia Care and Evaluation    Patient location during evaluation: bedside  Patient participation: complete - patient participated  Level of consciousness: awake  Pain score: 1  Pain management: adequate  Airway patency: patent  Anesthetic complications: No anesthetic complications    Cardiovascular status: acceptable  Respiratory status: acceptable  Hydration status: acceptable

## 2017-03-06 NOTE — OP NOTE
DATE OF PROCEDURE:  03/06/2017     PREOPERATIVE DIAGNOSIS: Pelvic pain, complex right ovarian cyst. Pain unresponsive to hormonal manipulation and nonsteroidal antiinflammatory medications, requiring daily narcotic medication.     POSTOPERATIVE DIAGNOSIS: Normal pelvis, simple right ovarian cyst, benign appearing functional.     PROCEDURE PERFORMED: Diagnostic laparoscopy with drainage of right ovarian cyst.     SURGEON: Marian Nettles MD    ESTIMATED BLOOD LOSS:   Less than 10 mL.    COMPLICATIONS: None.     FINDINGS: Tubes normal. Left ovary completely normal, 3 cm what appeared to be functional cyst on the right ovary, Appendix normal.  Bowel surfaces normal. No sign of any endometriosis.     DESCRIPTION OF PROCEDURE: After adequate general endotracheal anesthesia, the patient was placed in the dorsal lithotomy supine position and prepped and draped in the usual fashion for a combined vaginal and abdominal procedure. The bladder was drained with a straight catheter. Speculum was placed in the vagina. A single-tooth tenaculum was placed on the anterior lip of the cervix. Hulka cannula was placed through the cervical canal. Tenaculum and speculum were removed from the vagina. Attention was turned to the abdomen. Glove change was undertaken. A vertical infraumbilical 0.5 cm incision was made. A 5 mm disposable laparoscopic trocar was passed into the abdominal cavity, and the abdomen was insufflated with CO2 gas. A 2nd puncture site was placed in the suprapubic midline region using 5 mm non-disposable trocar.  The findings were as dictated. Tubes were normal. Left ovary was completely normal. Uterus was normal. Peritoneal surfaces were normal. There was no sign of any endometriosis. There was a 3 cm simple cyst on the right ovary that was aspirated and clear fluid was obtained. The cyst was then opened using the bipolar Kleppinger cautery forceps on loss-of-resistance technique to open the cyst. The cyst bed was  cauterized. There was adequate hemostasis. Bowel was peristalsing normally. There was no sign of any intra-abdominal or pelvic trauma.  Appendix was normal. Gas was expelled. Instruments were removed under direct visualization. Skin incisions were closed with 4-0 Vicryl in subcuticular fashion. Then, 20 mL of 0.5% Naropin was instilled in the umbilical incision and 10 mL in the suprapubic incision. All sponge, instrument, and needle counts were correct. Steri-Strips were placed on the lower incision and the patient was transferred to the recovery room in stable condition.       Marian Nettles M.D.  MC:carolina  D:   03/06/2017 08:41:37  T:   03/06/2017 09:16:37  Job ID:   [id number]  Document ID:   [id number]  cc:

## 2017-03-06 NOTE — BRIEF OP NOTE
DIAGNOSTIC LAPAROSCOPY  Procedure Note    Timothy Alarcon  3/6/2017    Pre-op Diagnosis:   Pelvic pain complex ovarian cyst    Post-op Diagnosis:   Nl pelvis, functional ovarian cyst    Procedure/CPT® Codes:      Procedure(s):  DIAGNOSTIC LAPAROSCOPY WITH DRAINAGE OF RIGHT OVARIAN CYST    Surgeon(s):  Marian Nettles MD    Anesthesia: General    Staff:   Circulator: Franchesca Sainz RN  Scrub Person: Melissa Gamboa    Estimated Blood Loss: * No values recorded between 3/6/2017  6:58 AM and 3/6/2017  7:36 AM *  Urine Voided: * No values recorded between 3/6/2017  6:58 AM and 3/6/2017  7:36 AM *    Specimens:                none      Drains:           Findings: simple functional appearingR ovarian cyst    Complications: none      Marian Nettles MD     Date: 3/6/2017  Time: 7:36 AM

## 2017-05-11 ENCOUNTER — OFFICE VISIT (OUTPATIENT)
Dept: GASTROENTEROLOGY | Facility: CLINIC | Age: 22
End: 2017-05-11

## 2017-05-11 VITALS — HEIGHT: 72 IN | WEIGHT: 176.8 LBS | BODY MASS INDEX: 23.95 KG/M2

## 2017-05-11 DIAGNOSIS — R63.4 WEIGHT LOSS, ABNORMAL: ICD-10-CM

## 2017-05-11 DIAGNOSIS — R10.31 RLQ ABDOMINAL PAIN: ICD-10-CM

## 2017-05-11 DIAGNOSIS — K62.5 RECTAL BLEEDING: Primary | ICD-10-CM

## 2017-05-11 DIAGNOSIS — K59.00 CONSTIPATION, UNSPECIFIED CONSTIPATION TYPE: ICD-10-CM

## 2017-05-11 LAB
BASOPHILS # BLD AUTO: 0.04 10*3/MM3 (ref 0–0.2)
BASOPHILS NFR BLD AUTO: 1 % (ref 0–1.5)
EOSINOPHIL # BLD AUTO: 0.14 10*3/MM3 (ref 0–0.7)
EOSINOPHIL NFR BLD AUTO: 3.6 % (ref 0.3–6.2)
ERYTHROCYTE [DISTWIDTH] IN BLOOD BY AUTOMATED COUNT: 12.3 % (ref 11.7–13)
HCT VFR BLD AUTO: 36.2 % (ref 35.6–45.5)
HGB BLD-MCNC: 12.1 G/DL (ref 11.9–15.5)
IMM GRANULOCYTES # BLD: 0 10*3/MM3 (ref 0–0.03)
IMM GRANULOCYTES NFR BLD: 0 % (ref 0–0.5)
LYMPHOCYTES # BLD AUTO: 1.53 10*3/MM3 (ref 0.9–4.8)
LYMPHOCYTES NFR BLD AUTO: 39.7 % (ref 19.6–45.3)
MCH RBC QN AUTO: 32.3 PG (ref 26.9–32)
MCHC RBC AUTO-ENTMCNC: 33.4 G/DL (ref 32.4–36.3)
MCV RBC AUTO: 96.5 FL (ref 80.5–98.2)
MONOCYTES # BLD AUTO: 0.23 10*3/MM3 (ref 0.2–1.2)
MONOCYTES NFR BLD AUTO: 6 % (ref 5–12)
NEUTROPHILS # BLD AUTO: 1.91 10*3/MM3 (ref 1.9–8.1)
NEUTROPHILS NFR BLD AUTO: 49.7 % (ref 42.7–76)
PLATELET # BLD AUTO: 291 10*3/MM3 (ref 140–500)
RBC # BLD AUTO: 3.75 10*6/MM3 (ref 3.9–5.2)
TSH SERPL DL<=0.005 MIU/L-ACNC: 1.72 MIU/ML (ref 0.27–4.2)
WBC # BLD AUTO: 3.85 10*3/MM3 (ref 4.5–10.7)

## 2017-05-11 PROCEDURE — 99203 OFFICE O/P NEW LOW 30 MIN: CPT | Performed by: INTERNAL MEDICINE

## 2017-05-11 RX ORDER — OXYCODONE HYDROCHLORIDE AND ACETAMINOPHEN 5; 325 MG/1; MG/1
1 TABLET ORAL EVERY 6 HOURS PRN
COMMUNITY
End: 2017-06-27 | Stop reason: HOSPADM

## 2017-05-12 ENCOUNTER — TELEPHONE (OUTPATIENT)
Dept: GASTROENTEROLOGY | Facility: CLINIC | Age: 22
End: 2017-05-12

## 2017-05-30 ENCOUNTER — TELEPHONE (OUTPATIENT)
Dept: GASTROENTEROLOGY | Facility: CLINIC | Age: 22
End: 2017-05-30

## 2017-06-05 ENCOUNTER — PREP FOR SURGERY (OUTPATIENT)
Dept: OTHER | Facility: HOSPITAL | Age: 22
End: 2017-06-05

## 2017-06-05 DIAGNOSIS — K62.5 RECTAL BLEEDING: ICD-10-CM

## 2017-06-05 DIAGNOSIS — R10.12 LUQ PAIN: Primary | ICD-10-CM

## 2017-06-27 ENCOUNTER — HOSPITAL ENCOUNTER (OUTPATIENT)
Facility: HOSPITAL | Age: 22
Setting detail: HOSPITAL OUTPATIENT SURGERY
Discharge: HOME | End: 2017-06-27
Attending: INTERNAL MEDICINE | Admitting: INTERNAL MEDICINE

## 2017-06-27 ENCOUNTER — ANESTHESIA EVENT (OUTPATIENT)
Dept: GASTROENTEROLOGY | Facility: HOSPITAL | Age: 22
End: 2017-06-27

## 2017-06-27 ENCOUNTER — ANESTHESIA (OUTPATIENT)
Dept: GASTROENTEROLOGY | Facility: HOSPITAL | Age: 22
End: 2017-06-27

## 2017-06-27 VITALS
HEART RATE: 80 BPM | HEIGHT: 72 IN | RESPIRATION RATE: 16 BRPM | BODY MASS INDEX: 22.81 KG/M2 | OXYGEN SATURATION: 97 % | SYSTOLIC BLOOD PRESSURE: 146 MMHG | DIASTOLIC BLOOD PRESSURE: 101 MMHG | WEIGHT: 168.4 LBS | TEMPERATURE: 98.1 F

## 2017-06-27 DIAGNOSIS — K62.5 RECTAL BLEEDING: ICD-10-CM

## 2017-06-27 DIAGNOSIS — R10.31 RLQ ABDOMINAL PAIN: ICD-10-CM

## 2017-06-27 DIAGNOSIS — R63.4 WEIGHT LOSS, ABNORMAL: ICD-10-CM

## 2017-06-27 LAB
B-HCG UR QL: NEGATIVE
INTERNAL NEGATIVE CONTROL: NEGATIVE
INTERNAL POSITIVE CONTROL: POSITIVE
Lab: NORMAL

## 2017-06-27 PROCEDURE — 43239 EGD BIOPSY SINGLE/MULTIPLE: CPT | Performed by: INTERNAL MEDICINE

## 2017-06-27 PROCEDURE — 88312 SPECIAL STAINS GROUP 1: CPT | Performed by: INTERNAL MEDICINE

## 2017-06-27 PROCEDURE — 88305 TISSUE EXAM BY PATHOLOGIST: CPT | Performed by: INTERNAL MEDICINE

## 2017-06-27 PROCEDURE — 45378 DIAGNOSTIC COLONOSCOPY: CPT | Performed by: INTERNAL MEDICINE

## 2017-06-27 PROCEDURE — 25010000002 PROPOFOL 10 MG/ML EMULSION: Performed by: ANESTHESIOLOGY

## 2017-06-27 PROCEDURE — S0260 H&P FOR SURGERY: HCPCS | Performed by: INTERNAL MEDICINE

## 2017-06-27 RX ORDER — PROPOFOL 10 MG/ML
VIAL (ML) INTRAVENOUS AS NEEDED
Status: DISCONTINUED | OUTPATIENT
Start: 2017-06-27 | End: 2017-06-27 | Stop reason: SURG

## 2017-06-27 RX ORDER — PROPOFOL 10 MG/ML
VIAL (ML) INTRAVENOUS CONTINUOUS PRN
Status: DISCONTINUED | OUTPATIENT
Start: 2017-06-27 | End: 2017-06-27 | Stop reason: SURG

## 2017-06-27 RX ORDER — SODIUM CHLORIDE 0.9 % (FLUSH) 0.9 %
1-10 SYRINGE (ML) INJECTION AS NEEDED
Status: DISCONTINUED | OUTPATIENT
Start: 2017-06-27 | End: 2017-06-27 | Stop reason: HOSPADM

## 2017-06-27 RX ORDER — IBUPROFEN 800 MG/1
800 TABLET ORAL EVERY 6 HOURS PRN
COMMUNITY
End: 2022-04-11

## 2017-06-27 RX ORDER — HYDROCORTISONE ACETATE 25 MG/1
25 SUPPOSITORY RECTAL NIGHTLY PRN
Qty: 30 SUPPOSITORY | Refills: 1 | OUTPATIENT
Start: 2017-06-27 | End: 2020-10-30

## 2017-06-27 RX ORDER — SODIUM CHLORIDE, SODIUM LACTATE, POTASSIUM CHLORIDE, CALCIUM CHLORIDE 600; 310; 30; 20 MG/100ML; MG/100ML; MG/100ML; MG/100ML
30 INJECTION, SOLUTION INTRAVENOUS CONTINUOUS PRN
Status: DISCONTINUED | OUTPATIENT
Start: 2017-06-27 | End: 2017-06-27 | Stop reason: HOSPADM

## 2017-06-27 RX ORDER — LIDOCAINE HYDROCHLORIDE 20 MG/ML
INJECTION, SOLUTION INFILTRATION; PERINEURAL AS NEEDED
Status: DISCONTINUED | OUTPATIENT
Start: 2017-06-27 | End: 2017-06-27 | Stop reason: SURG

## 2017-06-27 RX ADMIN — PROPOFOL 140 MCG/KG/MIN: 10 INJECTION, EMULSION INTRAVENOUS at 11:32

## 2017-06-27 RX ADMIN — PROPOFOL 150 MG: 10 INJECTION, EMULSION INTRAVENOUS at 11:32

## 2017-06-27 RX ADMIN — SODIUM CHLORIDE, POTASSIUM CHLORIDE, SODIUM LACTATE AND CALCIUM CHLORIDE: 600; 310; 30; 20 INJECTION, SOLUTION INTRAVENOUS at 11:28

## 2017-06-27 RX ADMIN — LIDOCAINE HYDROCHLORIDE 50 MG: 20 INJECTION, SOLUTION INFILTRATION; PERINEURAL at 11:32

## 2017-06-27 NOTE — ANESTHESIA POSTPROCEDURE EVALUATION
Patient: Timothy Alarcon    Procedure Summary     Date Anesthesia Start Anesthesia Stop Room / Location    06/27/17 1128 1154  [facility] /  [facility]       Procedure Diagnosis Surgeon Provider    COLONOSCOPY to cecum and Teminal Ileum (N/A ); ESOPHAGOGASTRODUODENOSCOPY with biopsies (N/A Esophagus) Rectal bleeding; RLQ abdominal pain; Weight loss, abnormal  (Rectal bleeding [K62.5]; RLQ abdominal pain [R10.31]; Weight loss, abnormal [R63.4]) MD Carl Olson MD          Anesthesia Type: MAC  Last vitals  BP (!) 141/101 (06/27/17 1157)    Temp      Pulse 83 (06/27/17 1157)   Resp 16 (06/27/17 1157)    SpO2 96 % (06/27/17 1157)      Post Anesthesia Care and Evaluation    Patient location during evaluation: PACU  Patient participation: complete - patient participated  Level of consciousness: awake and alert  Pain score: 0  Pain management: adequate  Airway patency: patent  Anesthetic complications: No anesthetic complications    Cardiovascular status: acceptable  Respiratory status: acceptable  Hydration status: acceptable

## 2017-06-27 NOTE — ANESTHESIA PREPROCEDURE EVALUATION
Anesthesia Evaluation     NPO Solid Status: < 2 hours       Airway   Mallampati: II  Dental - normal exam     Pulmonary     breath sounds clear to auscultation  Cardiovascular   Exercise tolerance: good (4-7 METS)    Rhythm: regular  Rate: normal        Neuro/Psych  GI/Hepatic/Renal/Endo      Musculoskeletal     Abdominal    Substance History      OB/GYN          Other                                        Anesthesia Plan    ASA 2     MAC     intravenous induction   Anesthetic plan and risks discussed with patient.

## 2017-06-27 NOTE — H&P
[facility]  Pre Procedure History & Physical    Chief Complaint:   Ruq/rlq, blood in stool    Subjective     HPI:   Timothy Alarcon is a 22 y.o. female who presents with rlq pain - started in January. Pain radiates to pelvis and ruq. Constipated as well - this started around the same time. She sometimes feels nauseated. She has vomited. Bowels moving every few days - she has taken laxatives to use the bathroom. She has had some bright red blood and it is in the stool and the toilet bowl. Mom has UC. She has lost 15-20 lbs since January. Appetite is poor. She is taking percocet for pain prn.         Abdominal Pain   This is a chronic problem. The current episode started more than 1 month ago. The onset quality is sudden. The problem occurs constantly. The most recent episode lasted 8 hours. The problem has been waxing and waning. The pain is located in the RLQ. The pain is at a severity of 9/10. The quality of the pain is aching, burning, dull and tearing. The abdominal pain radiates to the RUQ, right flank and pelvis. Associated symptoms include anorexia, constipation, a fever, headaches, hematochezia, nausea, vomiting and weight loss. Pertinent negatives include no arthralgias, belching, diarrhea, dysuria, flatus, frequency, hematuria, melena or myalgias. Nothing aggravates the pain. The pain is relieved by nothing. Prior diagnostic workup includes CT scan and ultrasound.   Weight Loss   Associated symptoms include abdominal pain, anorexia, a fever, headaches, nausea and vomiting. Pertinent negatives include no arthralgias or myalgias.        Past Medical History:   Past Medical History:   Diagnosis Date   • Ovarian cyst    • Pelvic pain    • Weight loss        Past Surgical History:  Past Surgical History:   Procedure Laterality Date   • DIAGNOSTIC LAPAROSCOPY N/A 3/6/2017    Procedure: DIAGNOSTIC LAPAROSCOPY WITH DRAINAGE OF RIGHT OVARIAN CYST;  Surgeon: Marian Nettles MD;  Location:   "[facility];  Service:    • NO PAST SURGERIES     • WISDOM TOOTH EXTRACTION         Family History:  Family History   Problem Relation Age of Onset   • Ulcerative colitis Mother    • Irritable bowel syndrome Sister        Social History:   reports that she has never smoked. She has never used smokeless tobacco. She reports that she drinks alcohol. She reports that she uses illicit drugs, including Marijuana.    Medications:   Prescriptions Prior to Admission   Medication Sig Dispense Refill Last Dose   • ibuprofen (ADVIL,MOTRIN) 800 MG tablet Take 800 mg by mouth Every 6 (Six) Hours As Needed for Mild Pain (1-3).   Past Month at Unknown time   • levonorgestrel-ethinyl estradiol (LUTERA) 0.1-20 MG-MCG per tablet Take 1 tablet by mouth Daily.   6/26/2017 at 0900   • oxyCODONE-acetaminophen (PERCOCET) 5-325 MG per tablet Take 1 tablet by mouth Every 6 (Six) Hours As Needed.   Past Month at Unknown time   • hyoscyamine (LEVSIN) 0.125 MG SL tablet Take 1 tablet by mouth 4 (Four) Times a Day With Meals & at Bedtime. 120 tablet 11 6/25/2017 at 1200       Allergies:  Tramadol    ROS:    Pertinent items are noted in HPI, all other systems reviewed and negative     Objective     Blood pressure 129/87, pulse 78, temperature 98.1 °F (36.7 °C), temperature source Oral, resp. rate 16, height 73\" (185.4 cm), weight 168 lb 6.4 oz (76.4 kg), SpO2 100 %.    Physical Exam   Constitutional: Pt is oriented to person, place, and time and well-developed, well-nourished, and in no distress.   Mouth/Throat: Oropharynx is clear and moist.   Neck: Normal range of motion.   Cardiovascular: Normal rate, regular rhythm   Pulmonary/Chest: Effort normal   Abdominal: Soft. Nontender  Skin: Skin is warm and dry.   Psychiatric: Mood, memory, affect and judgment normal.     Assessment/Plan     Diagnosis:  Ruq/rlq, blood in stool    Anticipated Surgical Procedure:  egd/colonoscopy    The risks, benefits, and alternatives of this procedure have been " discussed with the patient or the responsible party- the patient understands and agrees to proceed.

## 2017-06-27 NOTE — PLAN OF CARE
Problem: Patient Care Overview (Adult)  Goal: Plan of Care Review  Outcome: Ongoing (interventions implemented as appropriate)    06/27/17 1039   Coping/Psychosocial Response Interventions   Plan Of Care Reviewed With patient   Patient Care Overview   Progress no change   Outcome Evaluation   Outcome Summary/Follow up Plan pending procedure results       Goal: Adult Individualization and Mutuality  Outcome: Ongoing (interventions implemented as appropriate)    06/27/17 1039   Individualization   Patient Specific Preferences Timothy   Mutuality/Individual Preferences   What Anxieties, Fears or Concerns Do You Have About Your Health or Care? none       Goal: Discharge Needs Assessment  Outcome: Ongoing (interventions implemented as appropriate)    06/27/17 1039   Discharge Needs Assessment   Concerns To Be Addressed no discharge needs identified   Discharge Disposition home or self-care   Living Environment   Transportation Available car         Problem: GI Endoscopy (Adult)  Goal: Signs and Symptoms of Listed Potential Problems Will be Absent or Manageable (GI Endoscopy)  Outcome: Ongoing (interventions implemented as appropriate)    06/27/17 1039   GI Endoscopy   Problems Assessed (GI Endoscopy) pain;bleeding;hypoxia/hypoxemia   Problems Present (GI Endoscopy) none

## 2017-06-29 LAB
CYTO UR: NORMAL
LAB AP CASE REPORT: NORMAL
Lab: NORMAL
PATH REPORT.FINAL DX SPEC: NORMAL
PATH REPORT.GROSS SPEC: NORMAL

## 2017-07-05 ENCOUNTER — TELEPHONE (OUTPATIENT)
Dept: GASTROENTEROLOGY | Facility: CLINIC | Age: 22
End: 2017-07-05

## 2017-07-05 NOTE — TELEPHONE ENCOUNTER
----- Message from Beverly Salinas sent at 7/5/2017  1:08 PM EDT -----  Regarding: PATH RESULTS   Contact: [phone]  PT CALLED FOR C/S AND EGD PATH REPORT

## 2017-07-14 ENCOUNTER — TELEPHONE (OUTPATIENT)
Dept: GASTROENTEROLOGY | Facility: CLINIC | Age: 22
End: 2017-07-14

## 2017-07-14 NOTE — TELEPHONE ENCOUNTER
Called pt and advised per Dr Reddign that the gastric sm bowel bx were normal.  Continue med as prescribed at the time of her scope.  Advised to f/u in 6-8wks with either her or np. Pt verb understanding and made appt with Dr Redding for 08/29 at 1pm.

## 2017-07-14 NOTE — TELEPHONE ENCOUNTER
----- Message from Siri Redding MD sent at 7/12/2017 11:04 AM EDT -----  Gastric and small bowel biopsies were normal.  Continue medication as prescribed at the time of her endoscopy.  Please schedule 6-8 week follow-up with me or an NP.

## 2017-07-18 ENCOUNTER — OFFICE (OUTPATIENT)
Dept: URBAN - METROPOLITAN AREA CLINIC 75 | Facility: CLINIC | Age: 22
End: 2017-07-18

## 2017-07-18 VITALS — WEIGHT: 173 LBS | SYSTOLIC BLOOD PRESSURE: 132 MMHG | DIASTOLIC BLOOD PRESSURE: 64 MMHG | HEIGHT: 72 IN

## 2017-07-18 DIAGNOSIS — R10.31 RIGHT LOWER QUADRANT PAIN: ICD-10-CM

## 2017-07-18 DIAGNOSIS — K59.00 CONSTIPATION, UNSPECIFIED: ICD-10-CM

## 2017-07-18 PROCEDURE — 99202 OFFICE O/P NEW SF 15 MIN: CPT

## 2020-12-16 ENCOUNTER — HOSPITAL ENCOUNTER (INPATIENT)
Age: 25
LOS: 5 days | Discharge: HOME | DRG: 885 | End: 2020-12-21
Attending: EMERGENCY MEDICINE | Admitting: PSYCHIATRY & NEUROLOGY
Payer: MEDICAID

## 2020-12-16 PROBLEM — R45.851 SUICIDAL THOUGHTS: Status: ACTIVE | Noted: 2020-12-16

## 2020-12-16 LAB
ACETAMINOPHEN LEVEL: <15 UG/ML
ALBUMIN SERPL-MCNC: 4.3 G/DL (ref 3.5–5.2)
ALP BLD-CCNC: 61 U/L (ref 35–104)
ALT SERPL-CCNC: 10 U/L (ref 5–33)
AMPHETAMINE SCREEN, URINE: NEGATIVE
ANION GAP SERPL CALCULATED.3IONS-SCNC: 10 MMOL/L (ref 7–19)
AST SERPL-CCNC: 17 U/L (ref 5–32)
BARBITURATE SCREEN URINE: NEGATIVE
BASOPHILS ABSOLUTE: 0 K/UL (ref 0–0.2)
BASOPHILS RELATIVE PERCENT: 0.9 % (ref 0–1)
BENZODIAZEPINE SCREEN, URINE: NEGATIVE
BILIRUB SERPL-MCNC: 0.3 MG/DL (ref 0.2–1.2)
BUN BLDV-MCNC: 8 MG/DL (ref 6–20)
CALCIUM SERPL-MCNC: 8.9 MG/DL (ref 8.6–10)
CANNABINOID SCREEN URINE: POSITIVE
CHLORIDE BLD-SCNC: 100 MMOL/L (ref 98–111)
CO2: 27 MMOL/L (ref 22–29)
COCAINE METABOLITE SCREEN URINE: NEGATIVE
CREAT SERPL-MCNC: 0.9 MG/DL (ref 0.5–0.9)
EOSINOPHILS ABSOLUTE: 0.1 K/UL (ref 0–0.6)
EOSINOPHILS RELATIVE PERCENT: 3.2 % (ref 0–5)
ETHANOL: <10 MG/DL (ref 0–0.08)
GFR AFRICAN AMERICAN: >59
GFR NON-AFRICAN AMERICAN: >60
GLUCOSE BLD-MCNC: 105 MG/DL (ref 74–109)
HCG QUALITATIVE: NEGATIVE
HCT VFR BLD CALC: 41.8 % (ref 37–47)
HEMOGLOBIN: 13.8 G/DL (ref 12–16)
IMMATURE GRANULOCYTES #: 0 K/UL
LITHIUM LEVEL: 0.3 MMOL/L (ref 0.6–1.2)
LYMPHOCYTES ABSOLUTE: 1.6 K/UL (ref 1.1–4.5)
LYMPHOCYTES RELATIVE PERCENT: 36.2 % (ref 20–40)
Lab: ABNORMAL
MCH RBC QN AUTO: 32.8 PG (ref 27–31)
MCHC RBC AUTO-ENTMCNC: 33 G/DL (ref 33–37)
MCV RBC AUTO: 99.3 FL (ref 81–99)
MONOCYTES ABSOLUTE: 0.3 K/UL (ref 0–0.9)
MONOCYTES RELATIVE PERCENT: 5.9 % (ref 0–10)
NEUTROPHILS ABSOLUTE: 2.4 K/UL (ref 1.5–7.5)
NEUTROPHILS RELATIVE PERCENT: 53.6 % (ref 50–65)
OPIATE SCREEN URINE: NEGATIVE
PDW BLD-RTO: 11.5 % (ref 11.5–14.5)
PLATELET # BLD: 292 K/UL (ref 130–400)
PMV BLD AUTO: 9.3 FL (ref 9.4–12.3)
POTASSIUM SERPL-SCNC: 3.4 MMOL/L (ref 3.5–5)
RBC # BLD: 4.21 M/UL (ref 4.2–5.4)
SALICYLATE, SERUM: <3 MG/DL (ref 3–10)
SARS-COV-2, NAAT: NOT DETECTED
SODIUM BLD-SCNC: 137 MMOL/L (ref 136–145)
TOTAL PROTEIN: 7.4 G/DL (ref 6.6–8.7)
WBC # BLD: 4.4 K/UL (ref 4.8–10.8)

## 2020-12-16 PROCEDURE — 80178 ASSAY OF LITHIUM: CPT

## 2020-12-16 PROCEDURE — U0002 COVID-19 LAB TEST NON-CDC: HCPCS

## 2020-12-16 PROCEDURE — G0480 DRUG TEST DEF 1-7 CLASSES: HCPCS

## 2020-12-16 PROCEDURE — 85025 COMPLETE CBC W/AUTO DIFF WBC: CPT

## 2020-12-16 PROCEDURE — 99999 PR OFFICE/OUTPT VISIT,PROCEDURE ONLY: CPT | Performed by: EMERGENCY MEDICINE

## 2020-12-16 PROCEDURE — 84703 CHORIONIC GONADOTROPIN ASSAY: CPT

## 2020-12-16 PROCEDURE — 1240000000 HC EMOTIONAL WELLNESS R&B

## 2020-12-16 PROCEDURE — 36415 COLL VENOUS BLD VENIPUNCTURE: CPT

## 2020-12-16 PROCEDURE — 6370000000 HC RX 637 (ALT 250 FOR IP): Performed by: EMERGENCY MEDICINE

## 2020-12-16 PROCEDURE — 99284 EMERGENCY DEPT VISIT MOD MDM: CPT

## 2020-12-16 PROCEDURE — 6370000000 HC RX 637 (ALT 250 FOR IP): Performed by: PSYCHIATRY & NEUROLOGY

## 2020-12-16 PROCEDURE — 80053 COMPREHEN METABOLIC PANEL: CPT

## 2020-12-16 PROCEDURE — 80307 DRUG TEST PRSMV CHEM ANLYZR: CPT

## 2020-12-16 RX ORDER — PROPRANOLOL HYDROCHLORIDE 80 MG/1
80 TABLET ORAL 3 TIMES DAILY
COMMUNITY

## 2020-12-16 RX ORDER — TRAZODONE HYDROCHLORIDE 50 MG/1
50 TABLET ORAL NIGHTLY
Status: DISCONTINUED | OUTPATIENT
Start: 2020-12-16 | End: 2020-12-17

## 2020-12-16 RX ORDER — ACETAMINOPHEN 325 MG/1
650 TABLET ORAL EVERY 4 HOURS PRN
Status: DISCONTINUED | OUTPATIENT
Start: 2020-12-16 | End: 2020-12-17

## 2020-12-16 RX ORDER — BUSPIRONE HYDROCHLORIDE 15 MG/1
15 TABLET ORAL 2 TIMES DAILY
Status: ON HOLD | COMMUNITY
Start: 2020-09-28 | End: 2020-12-21 | Stop reason: HOSPADM

## 2020-12-16 RX ORDER — HYDROXYZINE HYDROCHLORIDE 25 MG/1
25 TABLET, FILM COATED ORAL 3 TIMES DAILY PRN
Status: DISCONTINUED | OUTPATIENT
Start: 2020-12-16 | End: 2020-12-21 | Stop reason: HOSPADM

## 2020-12-16 RX ORDER — LITHIUM CARBONATE 450 MG
450 TABLET, EXTENDED RELEASE ORAL 2 TIMES DAILY
Status: ON HOLD | COMMUNITY
End: 2020-12-21 | Stop reason: HOSPADM

## 2020-12-16 RX ORDER — OXCARBAZEPINE 600 MG/1
600 TABLET, FILM COATED ORAL 2 TIMES DAILY
Status: ON HOLD | COMMUNITY
End: 2020-12-21 | Stop reason: HOSPADM

## 2020-12-16 RX ORDER — POLYETHYLENE GLYCOL 3350 17 G/17G
17 POWDER, FOR SOLUTION ORAL DAILY PRN
Status: DISCONTINUED | OUTPATIENT
Start: 2020-12-16 | End: 2020-12-21 | Stop reason: HOSPADM

## 2020-12-16 RX ADMIN — TRAZODONE HYDROCHLORIDE 50 MG: 50 TABLET ORAL at 21:44

## 2020-12-16 RX ADMIN — POTASSIUM BICARBONATE 40 MEQ: 782 TABLET, EFFERVESCENT ORAL at 17:08

## 2020-12-16 RX ADMIN — HYDROXYZINE HYDROCHLORIDE 25 MG: 25 TABLET, FILM COATED ORAL at 21:44

## 2020-12-16 ASSESSMENT — ENCOUNTER SYMPTOMS
RHINORRHEA: 0
COUGH: 0
DIARRHEA: 0
VOMITING: 0
NAUSEA: 0
BACK PAIN: 0
SHORTNESS OF BREATH: 0
ABDOMINAL PAIN: 0
SORE THROAT: 0
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
## 2020-12-17 PROBLEM — F43.10 PTSD (POST-TRAUMATIC STRESS DISORDER): Status: ACTIVE | Noted: 2020-12-17

## 2020-12-17 PROBLEM — F31.12 BIPOLAR 1 DISORDER, MANIC, MODERATE (HCC): Status: ACTIVE | Noted: 2020-12-17

## 2020-12-17 LAB
CHOLESTEROL, TOTAL: 157 MG/DL (ref 160–199)
HBA1C MFR BLD: 5.3 % (ref 4–6)
HDLC SERPL-MCNC: 57 MG/DL (ref 65–121)
LDL CHOLESTEROL CALCULATED: 88 MG/DL
TRIGL SERPL-MCNC: 61 MG/DL (ref 0–149)

## 2020-12-17 PROCEDURE — 90792 PSYCH DIAG EVAL W/MED SRVCS: CPT | Performed by: NURSE PRACTITIONER

## 2020-12-17 PROCEDURE — 80061 LIPID PANEL: CPT

## 2020-12-17 PROCEDURE — 83036 HEMOGLOBIN GLYCOSYLATED A1C: CPT

## 2020-12-17 PROCEDURE — 1240000000 HC EMOTIONAL WELLNESS R&B

## 2020-12-17 PROCEDURE — 36415 COLL VENOUS BLD VENIPUNCTURE: CPT

## 2020-12-17 PROCEDURE — 6370000000 HC RX 637 (ALT 250 FOR IP): Performed by: PSYCHIATRY & NEUROLOGY

## 2020-12-17 PROCEDURE — 6370000000 HC RX 637 (ALT 250 FOR IP): Performed by: NURSE PRACTITIONER

## 2020-12-17 RX ORDER — TRAZODONE HYDROCHLORIDE 50 MG/1
50 TABLET ORAL NIGHTLY PRN
Status: DISCONTINUED | OUTPATIENT
Start: 2020-12-17 | End: 2020-12-21 | Stop reason: HOSPADM

## 2020-12-17 RX ORDER — LITHIUM CARBONATE 300 MG/1
300 CAPSULE ORAL
Status: DISCONTINUED | OUTPATIENT
Start: 2020-12-17 | End: 2020-12-17

## 2020-12-17 RX ORDER — QUETIAPINE FUMARATE 50 MG/1
50 TABLET, FILM COATED ORAL 2 TIMES DAILY
Status: DISCONTINUED | OUTPATIENT
Start: 2020-12-17 | End: 2020-12-17

## 2020-12-17 RX ORDER — QUETIAPINE FUMARATE 50 MG/1
100 TABLET, EXTENDED RELEASE ORAL NIGHTLY
Status: DISCONTINUED | OUTPATIENT
Start: 2020-12-17 | End: 2020-12-21 | Stop reason: HOSPADM

## 2020-12-17 RX ORDER — ACETAMINOPHEN 325 MG/1
650 TABLET ORAL EVERY 4 HOURS PRN
Status: DISCONTINUED | OUTPATIENT
Start: 2020-12-17 | End: 2020-12-21 | Stop reason: HOSPADM

## 2020-12-17 RX ORDER — PRAZOSIN HYDROCHLORIDE 1 MG/1
1 CAPSULE ORAL NIGHTLY
Status: DISCONTINUED | OUTPATIENT
Start: 2020-12-17 | End: 2020-12-20

## 2020-12-17 RX ORDER — OLANZAPINE 10 MG/1
10 TABLET, ORALLY DISINTEGRATING ORAL ONCE
Status: COMPLETED | OUTPATIENT
Start: 2020-12-17 | End: 2020-12-17

## 2020-12-17 RX ADMIN — LITHIUM CARBONATE 450 MG: 300 CAPSULE, GELATIN COATED ORAL at 17:01

## 2020-12-17 RX ADMIN — TRAZODONE HYDROCHLORIDE 50 MG: 50 TABLET ORAL at 20:28

## 2020-12-17 RX ADMIN — LITHIUM CARBONATE 300 MG: 300 CAPSULE, GELATIN COATED ORAL at 09:49

## 2020-12-17 RX ADMIN — HYDROXYZINE HYDROCHLORIDE 25 MG: 25 TABLET, FILM COATED ORAL at 09:49

## 2020-12-17 RX ADMIN — PRAZOSIN HYDROCHLORIDE 1 MG: 1 CAPSULE ORAL at 20:28

## 2020-12-17 RX ADMIN — OLANZAPINE 10 MG: 10 TABLET, ORALLY DISINTEGRATING ORAL at 09:49

## 2020-12-17 RX ADMIN — QUETIAPINE FUMARATE 100 MG: 50 TABLET, EXTENDED RELEASE ORAL at 20:28

## 2020-12-17 ASSESSMENT — SLEEP AND FATIGUE QUESTIONNAIRES
DO YOU HAVE DIFFICULTY SLEEPING: NO
AVERAGE NUMBER OF SLEEP HOURS: 12
DO YOU USE A SLEEP AID: NO

## 2020-12-17 ASSESSMENT — PATIENT HEALTH QUESTIONNAIRE - PHQ9: SUM OF ALL RESPONSES TO PHQ QUESTIONS 1-9: 24

## 2020-12-17 ASSESSMENT — PAIN SCALES - GENERAL: PAINLEVEL_OUTOF10: 0

## 2020-12-17 ASSESSMENT — LIFESTYLE VARIABLES: HISTORY_ALCOHOL_USE: NO
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
## 2020-12-17 NOTE — PLAN OF CARE
Problem: Depressive Behavior With or Without Suicide Precautions:  Goal: Able to verbalize acceptance of life and situations over which he or she has no control  Description: Able to verbalize acceptance of life and situations over which he or she has no control  Outcome: Ongoing  Goal: Able to verbalize and/or display a decrease in depressive symptoms  Description: Able to verbalize and/or display a decrease in depressive symptoms  Outcome: Ongoing  Goal: Ability to disclose and discuss suicidal ideas will improve  Description: Ability to disclose and discuss suicidal ideas will improve  Outcome: Ongoing  Goal: Able to verbalize support systems  Description: Able to verbalize support systems  Outcome: Ongoing  Goal: Absence of self-harm  Description: Absence of self-harm  Outcome: Ongoing  Goal: Patient specific goal  Description: Patient specific goal  Outcome: Ongoing  Goal: Participates in care planning  Description: Participates in care planning  Outcome: Ongoing     Problem: Altered Mood, Deterioration in Function:  Goal: Patient specific goal  Description: Patient specific goal  Outcome: Ongoing  Goal: Participates in care planning  Description: Participates in care planning  Outcome: Ongoing  Goal: Ability to perform activities of daily living will improve  Description: Ability to perform activities of daily living will improve  Outcome: Ongoing  Goal: Able to verbalize reality based thinking  Description: Able to verbalize reality based thinking  Outcome: Ongoing  Goal: Skin appearance normal  Description: Skin appearance normal  Outcome: Ongoing  Goal: Maintenance of adequate nutrition will improve  Description: Maintenance of adequate nutrition will improve  Outcome: Ongoing  Goal: Ability to tolerate increased activity will improve  Description: Ability to tolerate increased activity will improve  Outcome: Ongoing  Goal: Participates in care planning  Description: Participates in care planning Outcome: Ongoing  Goal: Patient specific goal  Description: Patient specific goal  Outcome: Ongoing     Problem: Risk of Harm:  Goal: Ability to remain free from injury will improve  Description: Ability to remain free from injury will improve  Outcome: Ongoing     Problem: Altered Mood, Depressive Behavior:  Goal: Able to verbalize acceptance of life and situations over which he or she has no control  Description: Able to verbalize acceptance of life and situations over which he or she has no control  Outcome: Ongoing  Goal: Able to verbalize and/or display a decrease in depressive symptoms  Description: Able to verbalize and/or display a decrease in depressive symptoms  Outcome: Ongoing  Goal: Ability to disclose and discuss suicidal ideas will improve  Description: Ability to disclose and discuss suicidal ideas will improve  Outcome: Ongoing  Goal: Able to verbalize support systems  Description: Able to verbalize support systems  Outcome: Ongoing  Goal: Absence of self-harm  Description: Absence of self-harm  Outcome: Ongoing  Goal: Patient specific goal  Description: Patient specific goal  Outcome: Ongoing  Goal: Participates in care planning  Description: Participates in care planning  Outcome: Ongoing     Problem: Anxiety:  Goal: Level of anxiety will decrease  Description: Level of anxiety will decrease  Outcome: Ongoing     Problem: Pain:  Goal: Pain level will decrease  Description: Pain level will decrease  Outcome: Ongoing  Goal: Control of acute pain  Description: Control of acute pain  Outcome: Ongoing  Goal: Control of chronic pain  Description: Control of chronic pain  Outcome: Ongoing     Problem: Coping:  Goal: Ability to cope will improve  Description: Ability to cope will improve  Outcome: Ongoing  Goal: Ability to verbalize feelings regarding rape will improve  Description: Ability to verbalize feelings regarding rape will improve  Outcome: Ongoing     Problem: Health Behavior: Goal: Ability to identify and utilize available support systems will improve  Description: Ability to identify and utilize available support systems will improve  Outcome: Ongoing     Problem: Safety:  Goal: Complications related to the disease process, condition or treatment will be avoided or minimized  Description: Complications related to the disease process, condition or treatment will be avoided or minimized  Outcome: Ongoing  Goal: Verbalizations of safety and security will increase  Description: Verbalizations of safety and security will increase  Outcome: Ongoing
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
## 2020-12-17 NOTE — PROGRESS NOTES
[facility] Daily Shift Assessment  Nursing Progress Note    Room: 06/608-01 Name: Yaima Hassan Age: 31 y.o. Ethnicity: -American Gender: female   Dx: Bipolar 1 disorder, manic, moderate (HCC)  Precautions: suicide risk  CPAP: No Accu-Chek: No  MSE:  Status and Exam  Normal: No  Facial Expression: Sad, Worried  Affect: Appropriate  Level of Consciousness: Alert  Mood:Normal: No  Mood: Depressed, Anxious  Motor Activity:Normal: No  Motor Activity: Decreased  Interview Behavior: Cooperative  Preception: [place] to Person, Particia Moh to Time, [place] to Place, [place] to Situation  Attention:Normal: No  Attention: Distractible, Unable to Concentrate  Thought Processes: Circumstantial  Thought Content:Normal: No  Thought Content: Paranoia  Hallucinations: Visual (Comment)  Delusions: No  Memory:Normal: No  Memory: Poor Recent, Poor Remote  Insight and Judgment: No  Insight and Judgment: Poor Judgment, Poor Insight  Present Suicidal Ideation: No  Present Homicidal Ideation: No  Sleep: Yes, Fair, no sleep issues Hours Slept: 7 Sched Sleep Meds: Yes and No PRN Sleep Meds: Yes Other PRN Meds: Yes Med Compliant: Yes Appetite: good Percent Meals: 75% Social: Yes ADLs: No Speech: normal Depression: 9 Anxiety: 9    Pt reports sleeping well last night. She is up with peers for breakfast.  She is friendly and cooperative. Social with peers and staff. Med compliant. Attends groups.     Chiara Delgado RN
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
## 2020-12-17 NOTE — PROGRESS NOTES
Group Therapy Note    Date: 12/16/2020  Start Time: 2000  End Time:  2030  Number of Participants: 8    Type of Group: Wrap-Up    Wellness Binder Information  Module Name:    Session Number:      Patient's Goal:  N/A    Notes:  Patient completed Wrap-Up worksheet. Status After Intervention:  Unchanged    Participation Level:  Active Listener    Participation Quality: Appropriate      Speech:  normal      Thought Process/Content: Logical      Affective Functioning: Congruent      Mood: CALM      Level of consciousness:  Alert      Response to Learning: Able to retain information      Endings: None Reported    Modes of Intervention: Education      Discipline Responsible: Octavia Stovall 1, [employer]      Signature:  Yas Padilla

## 2020-12-17 NOTE — PLAN OF CARE
Group Therapy Note    Date: 12/17/2020  Start Time: 1430  End Time:  0738  Number of Participants: 7    Type of Group: Recovery    Wellness Binder Information  Module Name:  Social Wellness  Session Number:  4    Group Goal for Pt: To improve knowledge of aspects of healthy relationships    Notes:  Pt did not attend group discussion. Pt was invited/encouraged. Status After Intervention:      Participation Level:     Participation Quality:       Speech:         Thought Process/Content:       Affective Functioning:       Mood:       Level of consciousness:        Response to Learning:       Endings:     Modes of Intervention:       Discipline Responsible:       Signature:  García Frey

## 2020-12-17 NOTE — PROGRESS NOTES
Requirement Note     SW met with pt to complete Psychosocial and CSSR-S on this date. Patients long and short term goals discussed. Pt voiced understanding. Treatment plan sheet signed. Pt verbalized understanding of the treatment plan. Pt participated in goals and objectives of the treatment plan. Pt completed safety plan with , pt received copy of plan, and original was placed into pt's chart. In the last 6 months has the pt been a danger to self: YES  In the last 6 months has the pt been a danger to others: NO  Legal Guardian/POA: NO     Provided pt with [employer] handout entitled \"Quitting Smoking. \"  Reviewed handout with pt addressing dangers of smoking, developing coping skills, and providing basic information about quitting. Patient received all components / Patient refused/declined practical counseling of tobacco practical counseling during the hospital stay.

## 2020-12-17 NOTE — PROGRESS NOTES
Pt admitted at [address] to [facility] Adult unit, at shift change at [address] pt calm and cooperative, pt searched Ana Briceño RN and Cadence RN, legal documents signed and understood by patient. Pt had no s/s of respiratory issues, no complaint of fever, body aches, or cough, pt understood use of masks on unit and patient distancing. Pt in room isolative to self, pt stated anxiety and depression are high and that she contracts for safety on the unit at this time. Pt denies HI and AVH and stated that she just is depressed and anxious and has no desire to eat, be social, perform ADL's at this time, \"My anxiety is so bad that I just hurt all over and nauseated because anxiety is so high and I'm just tired and want to sleep, called Dr Hoang Walker and received orders for Trazodone 50 mg and Atarax 25 mg prn 3 times daily for anxiety. Pt is resting in bed quietly not able to sleep, pt is med compliant this night and will continue to be monitored closely for safety.

## 2020-12-17 NOTE — PLAN OF CARE
Problem: Depressive Behavior With or Without Suicide Precautions:  Goal: Able to verbalize acceptance of life and situations over which he or she has no control  Description: Able to verbalize acceptance of life and situations over which he or she has no control  12/17/2020 1321 by Abdelrahman Hawk  Outcome: Ongoing  Note:                                                                     Group Therapy Note    Date: 12/17/2020  Start Time: 1000  End Time:  1193  Number of Participants: 10    Type of Group: Psychoeducation    Wellness Binder Information  Module Name:  Women's Issues  Session Number:  4    Group Goal for Pt: To raise awareness of how thoughts influence feelings    Notes:  Pt demonstrated improved awareness of how thoughts influence feelings by actively participating in group discussion. Status After Intervention:  Unchanged    Participation Level:  Active Listener and Interactive    Participation Quality: Appropriate      Speech:  normal      Thought Process/Content: Logical      Affective Functioning: Congruent      Mood: anxious and depressed      Level of consciousness:  Alert and Oriented x4      Response to Learning: Able to verbalize current knowledge/experience, Able to verbalize/acknowledge new learning, and Progressing to goal      Endings: None Reported    Modes of Intervention: Education      Discipline Responsible: Psychoeducational Specialist      Signature:  Abdelrahman Hawk

## 2020-12-18 LAB
ANION GAP SERPL CALCULATED.3IONS-SCNC: 10 MMOL/L (ref 7–19)
BUN BLDV-MCNC: 10 MG/DL (ref 6–20)
CALCIUM SERPL-MCNC: 8.6 MG/DL (ref 8.6–10)
CHLORIDE BLD-SCNC: 103 MMOL/L (ref 98–111)
CO2: 27 MMOL/L (ref 22–29)
CREAT SERPL-MCNC: 0.8 MG/DL (ref 0.5–0.9)
GFR AFRICAN AMERICAN: >59
GFR NON-AFRICAN AMERICAN: >60
GLUCOSE BLD-MCNC: 98 MG/DL (ref 74–109)
POTASSIUM SERPL-SCNC: 3.7 MMOL/L (ref 3.5–5)
SODIUM BLD-SCNC: 140 MMOL/L (ref 136–145)
TSH REFLEX FT4: 1.34 UIU/ML (ref 0.35–5.5)
VITAMIN B-12: 1772 PG/ML (ref 211–946)
VITAMIN D 25-HYDROXY: 30.1 NG/ML

## 2020-12-18 PROCEDURE — 6370000000 HC RX 637 (ALT 250 FOR IP): Performed by: NURSE PRACTITIONER

## 2020-12-18 PROCEDURE — 6370000000 HC RX 637 (ALT 250 FOR IP): Performed by: PSYCHIATRY & NEUROLOGY

## 2020-12-18 PROCEDURE — 84443 ASSAY THYROID STIM HORMONE: CPT

## 2020-12-18 PROCEDURE — 82607 VITAMIN B-12: CPT

## 2020-12-18 PROCEDURE — 1240000000 HC EMOTIONAL WELLNESS R&B

## 2020-12-18 PROCEDURE — 99231 SBSQ HOSP IP/OBS SF/LOW 25: CPT | Performed by: NURSE PRACTITIONER

## 2020-12-18 PROCEDURE — 82306 VITAMIN D 25 HYDROXY: CPT

## 2020-12-18 PROCEDURE — 36415 COLL VENOUS BLD VENIPUNCTURE: CPT

## 2020-12-18 PROCEDURE — 80048 BASIC METABOLIC PNL TOTAL CA: CPT

## 2020-12-18 RX ADMIN — HYDROXYZINE HYDROCHLORIDE 25 MG: 25 TABLET, FILM COATED ORAL at 17:02

## 2020-12-18 RX ADMIN — LITHIUM CARBONATE 450 MG: 300 CAPSULE, GELATIN COATED ORAL at 08:29

## 2020-12-18 RX ADMIN — QUETIAPINE FUMARATE 100 MG: 50 TABLET, EXTENDED RELEASE ORAL at 20:25

## 2020-12-18 RX ADMIN — TRAZODONE HYDROCHLORIDE 50 MG: 50 TABLET ORAL at 20:25

## 2020-12-18 RX ADMIN — PRAZOSIN HYDROCHLORIDE 1 MG: 1 CAPSULE ORAL at 20:25

## 2020-12-18 RX ADMIN — HYDROXYZINE HYDROCHLORIDE 25 MG: 25 TABLET, FILM COATED ORAL at 08:30

## 2020-12-18 RX ADMIN — LITHIUM CARBONATE 450 MG: 300 CAPSULE, GELATIN COATED ORAL at 17:01

## 2020-12-18 ASSESSMENT — PAIN SCALES - GENERAL: PAINLEVEL_OUTOF10: 0

## 2020-12-18 NOTE — H&P
HISTORY and PHYSICAL      CHIEF COMPLAINT:  SI    Reason for Admission:  SI    History Obtained From:  Patient, chart    HISTORY OF PRESENT ILLNESS:      The patient is a 31 y.o. female who is admitted to the [place] [address] unit with worsening mood issues. She denies any CP or SOA. No abdominal pain or N/V. No dysuria. No new pain complaints. No fevers. Past Medical History:    History reviewed. No pertinent past medical history. Past Surgical History:    History reviewed. No pertinent surgical history. Medications Prior to Admission:    Medications Prior to Admission: busPIRone (BUSPAR) 15 MG tablet, Take 15 mg by mouth 2 times daily  propranolol (INDERAL) 80 MG tablet, Take 80 mg by mouth 3 times daily  lithium (ESKALITH) 450 MG extended release tablet, Take 450 mg by mouth 2 times daily  OXcarbazepine (TRILEPTAL) 600 MG tablet, Take 600 mg by mouth 2 times daily Takes 1 1/2 bid    Allergies:  Patient has no known allergies. Social History:   TOBACCO:   has no history on file for tobacco.  ETOH:   has no history on file for alcohol. DRUGS:   has no history on file for drug. MARITAL STATUS:  single      Family History:       Problem Relation Age of Onset    High Blood Pressure Mother     Diabetes Mother      REVIEW OF SYSTEMS:  Constitutional: neg  CV: neg  Pulmonary: neg  GI: neg  : neg  Psych: SI  Neuro: neg  Skin: neg  MusculoSkeletal: neg  HEENT: neg  Joints: neg    Vitals:  /81   Pulse 72   Temp 97.2 °F (36.2 °C) (Temporal)   Resp 18   Ht 5' 11\" (1.803 m)   Wt 185 lb (83.9 kg)   SpO2 97%   BMI 25.80 kg/m²     PHYSICAL EXAM:  Gen: NAD, alert, in bed  HEENT: WNL  Lymph: no LAD  Neck: no JVD or masses  Chest: CTA bilat  CV: RRR  Abdomen: NT/ND  Extrem: no C/C/E  Neuro: non focal  Skin: no rashes  Joints: no redness    DATA:  I have reviewed the admission labs and imaging tests.     ASSESSMENT AND PLAN:      Principal Problem: Bipolar 1 disorder, manic, moderate, Suicidal thoughts--follow with Psych   Hypokalemia--recheck lab   THC Use    Leukopenia      Elle Barajas MD  12:39 AM 12/18/2020
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
## 2020-12-18 NOTE — PLAN OF CARE
Problem: Altered Mood, Deterioration in Function:  Goal: Ability to perform activities of daily living will improve  12/18/2020 1601 by Darren Leventhal  Outcome: Ongoing                                                                       Group Therapy Note    Date: 12/18/2020  Start Time: 4345  End Time:  1600  Number of Participants: 5    Type of Group: Recovery    Wellness Binder Information  Module Name:  relapse prevention  Session Number:  3    Patient's Goal:  too much stress can lead to relapse    Notes:  pt acknowledged use of positive coping skills to reduce stress and help prevent relapse.     Status After Intervention:  Improved    Participation Level: Interactive    Participation Quality: Appropriate, Attentive, and Sharing      Speech:  normal      Thought Process/Content: Logical      Affective Functioning: Congruent      Mood: congruent      Level of consciousness:  Alert, Oriented x4, and Attentive      Response to Learning: Able to verbalize current knowledge/experience      Endings: None Reported    Modes of Intervention: Education      Discipline Responsible: Psychoeducational Specialist      Signature:  Darren Leventhal

## 2020-12-18 NOTE — BH NOTE
Signed             Show:Clear all  [x]Manual[x]Template[]Copied    Added by:  Crystal Fleming    []Samy for details                                                                      Group Therapy Note     Date: 12/18/2020  Start Time: 1330  End Time:  1400  Number of Participants: 6     Type of Group: Spirituality: [facility]     Wellness Binder Information  Module Name:      Session Number:       Patient's Goal:       Notes:       Status After Intervention:  Improved     Participation Level:  Active Listener     Participation Quality: Appropriate, Attentive and Sharing        Speech:  normal        Thought Process/Content: Logical        Affective Functioning: Congruent        Mood: depressed        Level of consciousness:  Alert, Oriented x4 and Attentive        Response to Learning: Able to change behavior        Endings:      Modes of Intervention: Support        Discipline Responsible: Spiritual Care, ChaplainElectronically signed by Petar Ivan on 12/18/2020 at 2:14 PM        Signature:  Brendon Cortes

## 2020-12-18 NOTE — SUICIDE SAFETY PLAN
SAFETY PLAN    A suicide Safety Plan is a document that supports someone when they are having thoughts of suicide. Warning Signs that indicate a suicidal crisis may be developing: What (situations, thoughts, feelings, body sensations, behaviors, etc.) do you experience that lets you know you are beginning to think about suicide? 1. nightmares  2. Lack of care of anything  3. No appetite    Internal Coping Strategies:  What things can I do (relaxation techniques, hobbies, physical activities, etc.) to take my mind off my problems without contacting another person? 1. Meditation-deep breathing and mindful thinking  2. Reading a book  3. Listening to music    People and social settings that provide distraction: Who can I call or where can I go to distract me? 1. Name: Pb Torres    2. Name: Rito Spear     3. Place: gym            4. Place: park    People whom I can ask for help: Who can I call when I need help - for example, friends, family, clergy, someone else? 1. Name: Mirna Babcock                  2. Name: Patrick Osorio    3. Name: Brent Hyde or [address] [place] [address] I can contact during a crisis: Who can I call for help - for example, my doctor, my psychiatrist, my psychologist, a mental health provider, a suicide hotline? 1. Clinician Name: [id number] [address]   Phone: [phone]      Clinician Pager or Emergency Contact #: [phone]    2. Clinician Name: [address]   Phone: [phone]      Clinician Pager or Emergency Contact #: [phone]    3. Suicide Prevention Lifeline: 4-999-826-TALK (2751)    4. [facility] : [facility]      Emergency Services Address: [place] [address] [address] [facility]  Emergency Services Phone: [phone]    Making the environment safe: How can I make my environment (house/apartment/living space) safer? For example, can I remove guns, medications, and other items? 1. Remove all guns and weapons from the home. 2. Discard any extra medications in the home.   The one thing that is most important to me and worth living for is Michael Telles, and Raegan

## 2020-12-18 NOTE — PROGRESS NOTES
[facility] Adult Unit Daily Assessment  Nursing Progress Note    Room: [facility]/608-01   Name: Connie Hinson   Age: 31 y.o. Gender: female   Dx: Bipolar 1 disorder, manic, moderate (HCC)  Precautions: suicide risk  Inpatient Status: voluntary       SLEEP:    Sleep Quality Good  Sleep Medications: Yes, Trazodone 50mg, Seroquel 100mg & Minipress 1mg at HS   PRN Sleep Meds: No       MEDICAL:    Other PRN Meds: No   Med Compliant: Yes  Accu-Chek: No  Oxygen/CPAP/BiPAP: No  CIWA/CINA: No   PAIN Assessment: none  Side Effects from medication: No    Is Patient experiencing any respiratory symptoms (headache, fever, body aches, cough. Segundo Nabil ): no  Patient educated by nursing to practice social distancing, wear masks, wash hands frequently: yes      PSYCH:    Depression: 2   Anxiety: 2   SI denies suicidal ideation   HI Negative for homicidal ideation      AVH:Absent      GENERAL:    Appetite: good    Social: No   Speech: normal   Appearance: appropriately dressed, appropriately groomed and healthy looking    GROUP:    Group Participation: Yes  Participation Quality: Minimal    Notes: Pt stayed in her room this evening. Pt did complete her wrap-up sheet in her room. Pt pleasant, calm & med compliant. Pt asleep by 2100. Will continue to monitor via 15 minute rounds.       Electronically signed by Wes Diaz RN on 12/18/2020 at 2:08 AM

## 2020-12-18 NOTE — PROGRESS NOTES
[address] [place] [address]      Psychiatric Progress Note    Name:  Hany Shook  Date:  12/18/2020  Age:  31 y.o. Sex:  female  Ethnicity:   Primary Care Physician:  No primary care provider on file. Patient Care Team:  No care team member to display  Chief Complaint: \"I am feeling like myself today. \"        Historian: patient  Complaint Type: anxiety, decreased appetite, depression, loss of interest in favorite activities, mood swings and sleep disturbance  Course of Symptoms: improved  Precipitating Factors: history of mental illness, treatment noncompliance         Subjective  Nursing notes were reviewed and patient had no behavioral issues during the night. No prn medications were administered during the night. Today patient denies suicidal ideation, homicidal ideation and psychosis. She reports that she is feeling better now that she is back on medications. Feels more in control of her thoughts today. States, \"For the first time in a long time I feel like myself again. \" Has been social with peers. Patient reports sleep as \"really good. \" Denies having any nightmares last night. Denies flashback so far this morning. Patient has been calm and cooperative with staff and peers. Patient has been compliant with medications. Patient has been attending groups. Patient reports appetite as \"better. \" Patient reports no side effects from medications. Objective  Vitals:    12/17/20 1857   BP: 121/81   Pulse: 72   Resp: 18   Temp: 97.2 °F (36.2 °C)   SpO2: 97%       Previous Psychiatric/Substance Use History      Medical History:  History reviewed. No pertinent past medical history.      SALAZAR History:   Social History     Substance and Sexual Activity   Alcohol Use None         Social History     Substance and Sexual Activity   Drug Use Not on file        Social History     Tobacco Use   Smoking Status Not on file        Family History:     Family History   Problem Relation Age of Onset  High Blood Pressure Mother     Diabetes Mother             Mental Status:  Level of consciousness:  within normal limits and awake  Appearance:  well-appearing, street clothes, in chair, good grooming and good hygiene  Behavior/Motor:  no abnormalities noted  Attitude toward examiner:  cooperative, attentive and good eye contact  Speech:  normal rate and normal volume  Mood:  \" I am feeling like myself again. \"  Affect:  mood congruent  Thought processes:  linear and goal directed  Thought content:  Homocidal ideation :denies  Suicidal Ideation:  denies suicidal ideation  Delusions:  no evidence of delusions  Perceptual Disturbance:  denies any perceptual disturbance  Cognition:  oriented to person, place, and time  Concentration : good  Memory intact for recent and remote  Fund of knowledge:  average  Abstract thinking:  adquate  Insight:  good  Judgment:  good     lithium  450 mg Oral BID WC    prazosin  1 mg Oral Nightly    QUEtiapine  100 mg Oral Nightly       Current Medications:  Current Facility-Administered Medications   Medication Dose Route Frequency Provider Last Rate Last Admin    acetaminophen (TYLENOL) tablet 650 mg  650 mg Oral Q4H PRN Kamille Gomez MD        lithium capsule 450 mg  450 mg Oral BID WC Nancylee Broach, APRN   450 mg at 12/18/20 0829    traZODone (DESYREL) tablet 50 mg  50 mg Oral Nightly PRN Nancylee Kofiach, APRN   50 mg at 12/17/20 2028    prazosin (MINIPRESS) capsule 1 mg  1 mg Oral Nightly Nancylee Broach, APRN   1 mg at 12/17/20 2028    QUEtiapine (SEROQUEL XR) extended release tablet 100 mg  100 mg Oral Nightly Nancylee Broach, APRN   100 mg at 12/17/20 2028    polyethylene glycol (GLYCOLAX) packet 17 g  17 g Oral Daily PRN Kamille Gomez MD        hydrOXYzine (ATARAX) tablet 25 mg  25 mg Oral TID PRN Kamille Gomez MD   25 mg at 12/18/20 0830       Psychotherapy:   SUPPORTIVE    DSM V Diagnoses:      Principal Problem: Bipolar 1 disorder, manic, moderate (HCC)  Active Problems:    Suicidal thoughts    PTSD (post-traumatic stress disorder)  Resolved Problems:    * No resolved hospital problems. *            Plan:    Encourage group therapy  15 minute safety checks  Medical monitoring by Dr. Erick Ricci and associates  Continue current therapy and medications  Lithium level Monday    Amount of time spent with patient:  15 minutes with greater than 50% of the time spent in counseling and collaboration of care.     ERROL Cody  Clinician Signature: signed electronically

## 2020-12-18 NOTE — PROGRESS NOTES
Treatment Team Note:    LCSW met with [place] team to discuss Pts [address] plans. Progress/Behavior/Group Attendance: TBD    Target Symptoms/Reason for admission: depression, suicidal ideation    Diagnoses: Bi-polar 1, manic, ,moderate    UDS: Neg     BAL: Neg    AftercarePlan: [address] lives with: SW will meet with pt to gather information. Collateral obtained from: SW will meet with pt to gather release of information.   On:    Family Session: KOBYA    Misc:

## 2020-12-18 NOTE — PLAN OF CARE
Problem: Depressive Behavior With or Without Suicide Precautions:  Goal: Able to verbalize and/or display a decrease in depressive symptoms  12/18/2020 1546 by Danis Nix  Outcome: Ongoing                                                                       Group Therapy Note    Date: 12/18/2020  Start Time: 1430  End Time:  2684  Number of Participants: 5    Type of Group: Cognitive Skills    Wellness Binder Information  Module Name:  emotional wellness  Session Number:  1    Patient's Goal:  validation of feelings    Notes:  pt acknowledged to have feelings validated it may be necessary to share feelings with others.     Status After Intervention:  Improved    Participation Level: Interactive    Participation Quality: Appropriate, Attentive, and Sharing      Speech:  normal      Thought Process/Content: Logical      Affective Functioning: Congruent      Mood: congruent      Level of consciousness:  Alert, Oriented x4, and Attentive      Response to Learning: Able to verbalize current knowledge/experience      Endings: None Reported    Modes of Intervention: Education      Discipline Responsible: Psychoeducational Specialist      Signature:  Danis Nix

## 2020-12-18 NOTE — PROGRESS NOTES
Group Therapy Note    Date: 12/17/2020  Start Time: 2000  End Time:  2030  Number of Participants: 9    Type of Group: Wrap-Up    Wellness Binder Information  Module Name:    Session Number:      Patient's Goal:  See self-inventory    Notes:  Patient completed Wrap-Up worksheet. Status After Intervention:  Improved    Participation Level:  Active Listener    Participation Quality: Appropriate      Speech:  normal      Thought Process/Content: Logical      Affective Functioning: Congruent      Mood: calm      Level of consciousness:  Alert      Response to Learning: Able to retain information      Endings: None Reported    Modes of Intervention: Education      Discipline Responsible: Octavia Stovall 1, [facility]       Signature:  Don Sadler

## 2020-12-18 NOTE — PLAN OF CARE
Problem: Depressive Behavior With or Without Suicide Precautions:  Goal: Able to verbalize acceptance of life and situations over which he or she has no control  Description: Able to verbalize acceptance of life and situations over which he or she has no control  12/18/2020 1228 by Mariam Estrella  Outcome: Ongoing  Note:                                                                     Group Therapy Note    Date: 12/18/2020  Start Time: 1000  End Time:  5072  Number of Participants: 12    Type of Group: Psychoeducation    Wellness Binder Information  Module Name:  Relapse Prevention  Session Number:  5    Group Goal for Pt: To improve knowledge of relapse prevention strategies    Notes:  Pt demonstrated improved knowledge of relapse prevention strategies by actively participating in group discussion. Status After Intervention:  Unchanged    Participation Level:  Active Listener and Interactive    Participation Quality: Appropriate and Attentive      Speech:  normal      Thought Process/Content: Logical      Affective Functioning: Congruent      Mood: anxious and depressed      Level of consciousness:  Alert and Oriented x4      Response to Learning: Able to verbalize current knowledge/experience, Able to verbalize/acknowledge new learning, and Progressing to goal      Endings: None Reported    Modes of Intervention: Education      Discipline Responsible: Psychoeducational Specialist      Signature:  Mariam Estrella

## 2020-12-19 PROCEDURE — 1240000000 HC EMOTIONAL WELLNESS R&B

## 2020-12-19 PROCEDURE — 6370000000 HC RX 637 (ALT 250 FOR IP): Performed by: PSYCHIATRY & NEUROLOGY

## 2020-12-19 PROCEDURE — 6370000000 HC RX 637 (ALT 250 FOR IP): Performed by: NURSE PRACTITIONER

## 2020-12-19 RX ADMIN — HYDROXYZINE HYDROCHLORIDE 25 MG: 25 TABLET, FILM COATED ORAL at 20:38

## 2020-12-19 RX ADMIN — LITHIUM CARBONATE 450 MG: 300 CAPSULE, GELATIN COATED ORAL at 16:36

## 2020-12-19 RX ADMIN — HYDROXYZINE HYDROCHLORIDE 25 MG: 25 TABLET, FILM COATED ORAL at 13:27

## 2020-12-19 RX ADMIN — TRAZODONE HYDROCHLORIDE 50 MG: 50 TABLET ORAL at 20:36

## 2020-12-19 RX ADMIN — LITHIUM CARBONATE 450 MG: 300 CAPSULE, GELATIN COATED ORAL at 08:18

## 2020-12-19 RX ADMIN — HYDROXYZINE HYDROCHLORIDE 25 MG: 25 TABLET, FILM COATED ORAL at 08:18

## 2020-12-19 RX ADMIN — QUETIAPINE FUMARATE 100 MG: 50 TABLET, EXTENDED RELEASE ORAL at 20:36

## 2020-12-19 RX ADMIN — PRAZOSIN HYDROCHLORIDE 1 MG: 1 CAPSULE ORAL at 20:36
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
## 2020-12-19 NOTE — PROGRESS NOTES
Progress Note  Peter Horne  12/18/2020 11:49 PM  Subjective:   Admit Date:   12/16/2020      CC/ADMIT DX:       Interval History:   Reviewed overnight events and nursing notes. No new physical complaints. No CP or SOA. I have reviewed all labs/diagnostics from the last 24hrs. ROS:   I have done a 10 point ROS and all are negative, except what is mentioned in the HPI. DIET GENERAL;    Medications:      lithium  450 mg Oral BID WC    prazosin  1 mg Oral Nightly    QUEtiapine  100 mg Oral Nightly           Objective:   Vitals: /86   Pulse 82   Temp 97.8 °F (36.6 °C) (Temporal)   Resp 18   Ht 5' 11\" (1.803 m)   Wt 185 lb (83.9 kg)   SpO2 100%   BMI 25.80 kg/m²  No intake or output data in the 24 hours ending 12/18/20 2349  General appearance: alert and cooperative with exam  Extremities: extremities normal, atraumatic, no cyanosis or edema  Neurologic:  No obvious focal neurologic deficits. Skin: no rashes    Assessment and Plan:   Principal Problem:    Bipolar 1 disorder, manic, moderate ([facility])  Active Problems:    Suicidal thoughts    PTSD (post-traumatic stress disorder)  Resolved Problems:    * No resolved hospital problems. *      Plan:  1. Continue present medication(s)   2. Follow with Psych  3. She is medically stable. I will monitor for any changes or concerns. Discharge planning:   her home     Reviewed treatment plans with the patient and/or family.              Electronically signed by Narinder Pickett MD on 12/18/2020 at 11:49 PM

## 2020-12-19 NOTE — GROUP NOTE
Group Therapy Note    Date: 12/19/2020    Group Start Time: 7294  Group End Time: 1048  Group Topic: Healthy Living/Wellness    [facility]    Amber Dewitt RN        Group Therapy Note    Attendees: 5         Notes:  Knowta-board game of choice    Status After Intervention:  Improved    Participation Level:  Active Listener, Interactive and Monopolizing    Participation Quality: Appropriate, Attentive, Sharing and Supportive      Speech:  normal      Thought Process/Content: Logical  Linear      Affective Functioning: Congruent      Mood: anxious      Level of consciousness:  Oriented x4      Response to Learning: Able to verbalize current knowledge/experience, Able to verbalize/acknowledge new learning, Able to retain information and Capable of insight          Modes of Intervention: Socialization and Activity      Discipline Responsible: Registered Nurse      Signature:  Amber Dewitt RN

## 2020-12-19 NOTE — PROGRESS NOTES
BHI Daily Shift Assessment  Nursing Progress Note    Room: 0608/608-01 Name: Luther Barroso Age: 31 y.o. Gender: female   Dx: Bipolar 1 disorder, manic, moderate (HCC)  Precautions: suicide risk  Target Symptoms:   Accu-Chek: NoSleep: Yes,Sleep Quality Good SI No AVH denies [address] [place] [address]  ADLs: Yes Speech: normal Depression: 2 Anxiety: 5   Participation LevelActive Listener and Interactive  Appetite: Good  Respiratory symptoms: No Headache: No Body aches: No Fever: No Cough: No  Patients encouraged to wear masks, wash hands frequently and practice social distancing while on the unit: Yes  Visitation: No due to covid 19  Participation QualityAppropriate, Attentive, Sharing and Supportive    Complaints:none    Notes: alert and oriented x4. Out in day area watching television and socializing with peers. Attending groups. Wearing casual attire, well groomed. Affect is bright and congruent with mood. Thought processes are linear and goal directed. Compliant with medications. Appetite is good. Reports good sleep.     Signature: Electronically signed by Alex Chacko RN on 12/19/20 at 11:21 AM CST

## 2020-12-19 NOTE — PROGRESS NOTES
BHI Daily Shift Assessment  Nursing Progress Note    Room: 0608/608-01 Name: Josey Leggett Age: 31 y.o. Gender: female   Dx: Bipolar 1 disorder, manic, moderate (HCC)  Precautions: suicide risk  Target Symptoms:   Accu-Chek: NoSleep: Yes,Sleep Quality Fair SI No AVH denies [address] [place] [address]  ADLs: Yes Speech: normal Depression: 0 Anxiety: 0   Participation LevelActive Listener  Appetite: Good  Respiratory symptoms: No Headache: No Body aches: No Fever: No Cough: No  Patients encouraged to wear masks, wash hands frequently and practice social distancing while on the unit: Yes  Visitation: No   Participation QualityAppropriate    Complaints:no    Notes: Patient has been pleasant and cooperative. She has denies depression, anxiety, SI, HI, AVH. No problems or concerns voiced this evening.      Signature: Electronically signed by Corbin Brown RN on 12/19/2020 at 12:30 AM

## 2020-12-19 NOTE — PROGRESS NOTES
Group Note    Number of Participants in Group: 7  Number of Patients on Unit:7      Patient attended group:Yes  Reason for Absence:  Intervention for patient absence:        Type of Group:   Wrap-Up/Relaxation    Patient's Goal: See wrap up group sheet    Participation Level:     Active Listener           Patient Response to Learning: Yes    Patient's Behavior: Cooperative    Is Patient Social/Interacting: No    Relaxation:   Television:No   Reading:No   Game/Puzzle:No   Phone: No       Notes/Comments:      Please see patient's wrap up group sheet for patient's comments       Electronically signed by Dany Hartman RN on 12/19/20 at 12:25 AM CST

## 2020-12-20 PROCEDURE — 6370000000 HC RX 637 (ALT 250 FOR IP): Performed by: PSYCHIATRY & NEUROLOGY

## 2020-12-20 PROCEDURE — 6370000000 HC RX 637 (ALT 250 FOR IP): Performed by: NURSE PRACTITIONER

## 2020-12-20 PROCEDURE — 99233 SBSQ HOSP IP/OBS HIGH 50: CPT | Performed by: PSYCHIATRY & NEUROLOGY

## 2020-12-20 PROCEDURE — 1240000000 HC EMOTIONAL WELLNESS R&B

## 2020-12-20 RX ORDER — PRAZOSIN HYDROCHLORIDE 2 MG/1
2 CAPSULE ORAL NIGHTLY
Status: DISCONTINUED | OUTPATIENT
Start: 2020-12-20 | End: 2020-12-21 | Stop reason: HOSPADM

## 2020-12-20 RX ADMIN — LITHIUM CARBONATE 450 MG: 300 CAPSULE, GELATIN COATED ORAL at 17:06

## 2020-12-20 RX ADMIN — HYDROXYZINE HYDROCHLORIDE 25 MG: 25 TABLET, FILM COATED ORAL at 09:09

## 2020-12-20 RX ADMIN — HYDROXYZINE HYDROCHLORIDE 25 MG: 25 TABLET, FILM COATED ORAL at 17:17

## 2020-12-20 RX ADMIN — QUETIAPINE FUMARATE 100 MG: 50 TABLET, EXTENDED RELEASE ORAL at 21:32

## 2020-12-20 RX ADMIN — LITHIUM CARBONATE 450 MG: 300 CAPSULE, GELATIN COATED ORAL at 08:24

## 2020-12-20 RX ADMIN — TRAZODONE HYDROCHLORIDE 50 MG: 50 TABLET ORAL at 21:33

## 2020-12-20 RX ADMIN — PRAZOSIN HYDROCHLORIDE 2 MG: 2 CAPSULE ORAL at 21:33

## 2020-12-20 ASSESSMENT — PAIN SCALES - GENERAL: PAINLEVEL_OUTOF10: 0
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
## 2020-12-20 NOTE — PROGRESS NOTES
Department of Psychiatry  Attending Progress Note      SUBJECTIVE:    31years old female with previous psychiatric history of bipolar disorder, PTSD, anxiety disorder, obsessive-compulsive disorder, who has been admitted to psychiatric unit secondary to suicidal ideations. Patient has been seen in treatment team room. She reported that her condition significantly improved during this hospital stay and her mood is \"good\" today. She endorses good appetite and improved quality of sleep, stated that she slept 7-8 hours during the last night and woke up rested well on the morning. Patient is compliant with currently prescribed medications and denies any side effects. She endorses beneficial effect of prescribed psychotropic medications for her mood and sleep. She continues to report mild feeling of depression and anxiety, and rated both of them as 34 out of 10, with 10 being the worst.  Patient attends group activities in the unit and participates in them. She is social with medical staff and other patients in the unit. She performs her ADLs daily. Behaviorally, she became more stable and appropriate. Patient denies current active suicidal or homicidal ideations, denies any plans. Also, she denies any auditory and visual hallucinations. OBJECTIVE    Physical  VITALS:  BP (!) 132/90   Pulse 86   Temp 97.7 °F (36.5 °C) (Temporal)   Resp 16   Ht 5' 11\" (1.803 m)   Wt 185 lb (83.9 kg)   SpO2 100%   BMI 25.80 kg/m²   TEMPERATURE:  Current - Temp: 97.7 °F (36.5 °C);  Max - Temp  Av.7 °F (36.5 °C)  Min: 97.7 °F (36.5 °C)  Max: 97.7 °F (36.5 °C)  RESPIRATIONS RANGE: Resp  Av  Min: 16  Max: 16  PULSE RANGE: Pulse  Av  Min: 86  Max: 86  BLOOD PRESSURE RANGE:  Systolic (19MJD), FFW:654 , Min:132 , TNI:308   ; Diastolic (32VXA), CWM:19, Min:90, Max:90    PULSE OXIMETRY RANGE: SpO2  Av %  Min: 100 %  Max: 100 %    Review of Systems: 14 point review of systems is negative Psychological ROS: Mild depression and mild anxiety    Mental Status Examination:    Appearance: Appropriately groomed, wearing casual civilian clothes. Made good eye contact. Behavior: Calm, cooperative, friendly, and socially appropriate. No psychomotor retardation/agitation appreciated. Gait unremarkable. Speech: Normal in tone, slightly hyperverbal, no pressured speech noted. Mood: \"Good\"   Affect: Mood congruent. Range is full  Thought Process: Appears linear and goal oriented. Thought Content: Patient does not have any current active suicidal and homicidal ideations. No overt delusions or paranoia appreciated. Perceptions: Seems patient does not have any auditory or visual hallucinations at present time. Patient did not appear to be responding to internal stimuli. No overt psychosis. Orientation: to person, place, date, and situation. Alert. Impulsivity: Improved  Neurovegitative: Good appetite, good sleep  Insight: Present  Judgment: Improved    Data  No new lab test results to review    Medications  Current Facility-Administered Medications: acetaminophen (TYLENOL) tablet 650 mg, 650 mg, Oral, Q4H PRN  lithium capsule 450 mg, 450 mg, Oral, BID WC  traZODone (DESYREL) tablet 50 mg, 50 mg, Oral, Nightly PRN  prazosin (MINIPRESS) capsule 1 mg, 1 mg, Oral, Nightly  QUEtiapine (SEROQUEL XR) extended release tablet 100 mg, 100 mg, Oral, Nightly  polyethylene glycol (GLYCOLAX) packet 17 g, 17 g, Oral, Daily PRN  hydrOXYzine (ATARAX) tablet 25 mg, 25 mg, Oral, TID PRN    ASSESSMENT AND PLAN    DSM 5 Diagnoses:    Bipolar 1 disorder, manic, moderate   Chronic PTSD  Suicidal ideations    Plan:   1. Psychiatric Medications:   Continue current psychotropic medications as recommended. Patient denies side effect of currently prescribed medications. Will increase dose of prazosin from 1 mg to 2 mg at bedtime for PTSD related nightmares.

## 2020-12-20 NOTE — PROGRESS NOTES
[facility] Adult Unit Daily Assessment  Nursing Progress Note    Room: 0608/608-01   Name: Peter Horne   Age: 31 y.o. Gender: female   Dx: Bipolar 1 disorder, manic, moderate (HCC)  Precautions: suicide risk  Inpatient Status: voluntary       SLEEP:    Sleep Quality Good  Sleep Medications: Yes   PRN Sleep Meds: Yes       MEDICAL:    Other PRN Meds: No   Med Compliant: Yes  Accu-Chek: No  Oxygen/CPAP/BiPAP: No  CIWA/CINA: No   PAIN Assessment: none  Side Effects from medication: No    Is Patient experiencing any respiratory symptoms (headache, fever, body aches, cough. [place] ): no  Patient educated by nursing to practice social distancing, wear masks, wash hands frequently: yes      PSYCH:    Depression: 5   Anxiety: 5   SI denies suicidal ideation   HI Negative for homicidal ideation      AVH:Absent      GENERAL:    Appetite: good    Social: Yes   Speech: normal   Appearance: appropriately dressed and healthy looking    Notes: Patient stated she was sad due to not able to go to her niece birthday party. Patient reported that she felt hopeful because she was starting to feel better. Patient stated that she had family support. Patient reported that she had blood in her stools. Nurse instucted the patient to let the nurse check her stools for blood. Continue to monitor.          Electronically signed by Mac Bryant RN on 12/19/20 at 10:23 PM CST
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
## 2020-12-20 NOTE — PROGRESS NOTES
BHI Daily Shift Assessment  Nursing Progress Note    5 [facility]    Room: [facility]  Name: Sil  Age: 31   Gender: F   Dx: Depression/SI  Precautions: Suicide  Target Symptoms: Suicidal thought   Accu-Chek: no  Sleep Quality: 7 hrs  SI: no  HI: no  AVH: no  ADLs: yes  Speech: clear  Depression: moderate  Anxiety: moderate  Participation Quality: high  Appetite: 75%  Respiratory symptoms: none  Headache: none  Body aches: none  Fever: none  Cough: none  Patients encouraged to wear masks / wash hands frequently / practice social distancing: yes  Visitation: n/a  Complaints: none    Notes: Patient calm and cooperative, appearance clean, thought organized, alert/oriented, activities and self care done.     Signature: Maite Andrade RN

## 2020-12-21 VITALS
OXYGEN SATURATION: 100 % | HEIGHT: 71 IN | DIASTOLIC BLOOD PRESSURE: 72 MMHG | HEART RATE: 87 BPM | WEIGHT: 185 LBS | BODY MASS INDEX: 25.9 KG/M2 | TEMPERATURE: 98.2 F | SYSTOLIC BLOOD PRESSURE: 126 MMHG | RESPIRATION RATE: 18 BRPM

## 2020-12-21 LAB — LITHIUM LEVEL: 0.5 MMOL/L (ref 0.6–1.2)

## 2020-12-21 PROCEDURE — 99239 HOSP IP/OBS DSCHRG MGMT >30: CPT | Performed by: PSYCHIATRY & NEUROLOGY

## 2020-12-21 PROCEDURE — 6370000000 HC RX 637 (ALT 250 FOR IP): Performed by: NURSE PRACTITIONER

## 2020-12-21 PROCEDURE — 5130000000 HC BRIDGE APPOINTMENT

## 2020-12-21 PROCEDURE — 36415 COLL VENOUS BLD VENIPUNCTURE: CPT

## 2020-12-21 PROCEDURE — 6370000000 HC RX 637 (ALT 250 FOR IP): Performed by: PSYCHIATRY & NEUROLOGY

## 2020-12-21 PROCEDURE — 80178 ASSAY OF LITHIUM: CPT

## 2020-12-21 RX ORDER — QUETIAPINE FUMARATE 50 MG/1
100 TABLET, EXTENDED RELEASE ORAL NIGHTLY
Qty: 30 TABLET | Refills: 1 | Status: SHIPPED | OUTPATIENT
Start: 2020-12-21

## 2020-12-21 RX ORDER — LITHIUM CARBONATE 300 MG/1
300 CAPSULE ORAL 2 TIMES DAILY WITH MEALS
Qty: 60 CAPSULE | Refills: 1 | Status: SHIPPED | OUTPATIENT
Start: 2020-12-21

## 2020-12-21 RX ORDER — LITHIUM CARBONATE 150 MG/1
150 CAPSULE ORAL 2 TIMES DAILY WITH MEALS
Qty: 60 CAPSULE | Refills: 1 | Status: SHIPPED | OUTPATIENT
Start: 2020-12-21

## 2020-12-21 RX ORDER — PRAZOSIN HYDROCHLORIDE 2 MG/1
2 CAPSULE ORAL NIGHTLY
Qty: 30 CAPSULE | Refills: 1 | Status: SHIPPED | OUTPATIENT
Start: 2020-12-21

## 2020-12-21 RX ORDER — HYDROXYZINE HYDROCHLORIDE 25 MG/1
25 TABLET, FILM COATED ORAL 3 TIMES DAILY PRN
Qty: 90 TABLET | Refills: 1 | Status: SHIPPED | OUTPATIENT
Start: 2020-12-21

## 2020-12-21 RX ORDER — TRAZODONE HYDROCHLORIDE 50 MG/1
50 TABLET ORAL NIGHTLY PRN
Qty: 30 TABLET | Refills: 1 | Status: SHIPPED | OUTPATIENT
Start: 2020-12-21

## 2020-12-21 RX ADMIN — LITHIUM CARBONATE 450 MG: 300 CAPSULE, GELATIN COATED ORAL at 08:03

## 2020-12-21 RX ADMIN — HYDROXYZINE HYDROCHLORIDE 25 MG: 25 TABLET, FILM COATED ORAL at 08:03

## 2020-12-21 NOTE — PROGRESS NOTES
BHI Daily Shift Assessment  Nursing Progress Note    Room: 0608/608-01 Name: Briana Pierre Age: 31 y.o. Gender: female   Dx: Bipolar 1 disorder, manic, moderate (HCC)  Precautions: suicide risk  Target Symptoms:   Accu-Chek: NoSleep: Yes,Sleep Quality Good SI No AVH denies [address] [place] [address]  ADLs: Yes Speech: normal Depression: 0 Anxiety: 0   Participation LevelActive Listener and Interactive  Appetite: Good  Respiratory symptoms: No Headache: No Body aches: No Fever: No Cough: No  Patients encouraged to wear masks, wash hands frequently and practice social distancing while on the unit: Yes  Visitation: No due to covid 19  Participation QualityAppropriate, Attentive, Sharing and Supportive    Complaints:none    Notes: alert and oriented x4. Pleasant, calm and cooperative. Affect is bright and congruent with mood. Wearing casual attire. Well groomed. Appetite is good. Reports good sleep. Compliant with medications . social and attending groups.      Signature: Electronically signed by Sanya Stockton RN on 12/21/20 at 8:57 AM CST

## 2020-12-21 NOTE — PLAN OF CARE
Problem: Depressive Behavior With or Without Suicide Precautions:  Goal: Able to verbalize acceptance of life and situations over which he or she has no control  Outcome: Completed  Goal: Able to verbalize and/or display a decrease in depressive symptoms  Outcome: Completed  Goal: Ability to disclose and discuss suicidal ideas will improve  Outcome: Completed  Goal: Able to verbalize support systems  Outcome: Completed  Goal: Absence of self-harm  Outcome: Completed  Goal: Patient specific goal  Outcome: Completed  Goal: Participates in care planning  Outcome: Completed     Problem: Altered Mood, Deterioration in Function:  Goal: Patient specific goal  Outcome: Completed  Goal: Participates in care planning  Outcome: Completed  Goal: Ability to perform activities of daily living will improve  Outcome: Completed  Goal: Able to verbalize reality based thinking  Outcome: Completed  Goal: Skin appearance normal  Outcome: Completed  Goal: Maintenance of adequate nutrition will improve  Outcome: Completed  Goal: Ability to tolerate increased activity will improve  Outcome: Completed  Goal: Participates in care planning  Outcome: Completed  Goal: Patient specific goal  Outcome: Completed     Problem: Risk of Harm:  Goal: Ability to remain free from injury will improve  Outcome: Completed     Problem: Anxiety:  Goal: Level of anxiety will decrease  Outcome: Completed     Problem: Pain:  Goal: Pain level will decrease  Outcome: Completed  Goal: Control of acute pain  Outcome: Completed  Goal: Control of chronic pain  Outcome: Completed     Problem: Altered Mood, Depressive Behavior:  Goal: Able to verbalize acceptance of life and situations over which he or she has no control  Outcome: Completed  Goal: Able to verbalize and/or display a decrease in depressive symptoms  Outcome: Completed  Goal: Ability to disclose and discuss suicidal ideas will improve  Outcome: Completed  Goal: Able to verbalize support systems Outcome: Completed  Goal: Absence of self-harm  Outcome: Completed  Goal: Patient specific goal  Outcome: Completed  Goal: Participates in care planning  Outcome: Completed     Problem: Coping:  Goal: Ability to cope will improve  Outcome: Completed  Goal: Ability to verbalize feelings regarding rape will improve  Outcome: Completed     Problem: Health Behavior:  Goal: Ability to identify and utilize available support systems will improve  Outcome: Completed     Problem: Safety:  Goal: Complications related to the disease process, condition or treatment will be avoided or minimized  Outcome: Completed  Goal: Verbalizations of safety and security will increase  Outcome: Completed

## 2020-12-21 NOTE — DISCHARGE INSTR - DIET
? Good nutrition is important when healing from an illness, injury, or surgery. Follow any nutrition recommendations given to you during your hospital stay. ? If you were given an oral nutrition supplement while in the hospital, continue to take this supplement at home. You can take it with meals, in-between meals, and/or before bedtime. These supplements can be purchased at most local grocery stores, pharmacies, and chain [employer]-stores. ? If you have any questions about your diet or nutrition, call the hospital and ask for the dietitian.   General diet

## 2020-12-21 NOTE — PROGRESS NOTES
[facility] Adult Unit Daily Assessment  Nursing Progress Note    Room: 06/608-01   Name: Abraham Ramirez   Age: 31 y.o. Gender: female   Dx: Bipolar 1 disorder, manic, moderate (HCC)  Precautions: homocidal risk  Inpatient Status: voluntary       SLEEP:    Sleep Quality Good  Sleep Medications: No   PRN Sleep Meds: Yes       MEDICAL:    Other PRN Meds: No   Med Compliant: Yes  Accu-Chek: No  Oxygen/CPAP/BiPAP: No  CIWA/CINA: No   PAIN Assessment: none  Side Effects from medication: No    Is Patient experiencing any respiratory symptoms (headache, fever, body aches, cough. Artem Magic ): no  Patient educated by nursing to practice social distancing, wear masks, wash hands frequently: yes      PSYCH:    Depression: 3   Anxiety: 8   SI denies suicidal ideation   HI Negative for homicidal ideation      AVH:Absent      GENERAL:    Appetite: good    Social: Yes   Speech: normal   Appearance: appropriately dressed, appropriately groomed, good hygiene and healthy looking    GROUP:    Group Participation: Yes  Participation Quality: Interactive    Notes:   Patient is alert, oriented, calm and cooperative with staff and peers. Patient has been in the day area socializing with peers, playing cards and doing arts and cafts this evening. patient has good eye contact during interview and reports that her anxiety is up because she is thinking about her discharge tomorrow and does not want to relapse. No obvious s/s of distress voiced or noted. Will continue to monitor.     Electronically signed by Wolfgang Jasmine RN on 12/20/20 at 10:41 PM CST

## 2020-12-21 NOTE — PROGRESS NOTES
Group Therapy Note    Start Time: 800  End Time:  812  Number of Participants: 8    Type of Group: Community Meeting       Patient's Goal:  \"depression staying down\"      Notes:      Participation Level:  Active Listener       Participation Quality: Appropriate      Thought Process/Content: Logical      Affective Functioning: Congruent      Mood: calm      Level of consciousness:  Alert      Modes of Intervention: Support      Discipline Responsible: Behavioral Health Tech II      Signature:  Mary Ray

## 2020-12-21 NOTE — PROGRESS NOTES
Group Therapy Note    Date: 12/20/2020  Start Time: 2000  End Time:  2030  Number of Participants: 10    Type of Group: Wrap-Up    Wellness Binder Information  Module Name:    Session Number:      Patient's Goal:  See self-inventory    Notes:  Patient completed Wrap-Up worksheet. Status After Intervention:  Improved    Participation Level:  Active Listener    Participation Quality: Appropriate      Speech:  normal      Thought Process/Content: Logical      Affective Functioning: Congruent      Mood: calm      Level of consciousness:  Alert      Response to Learning: Able to retain information      Endings: None Reported    Modes of Intervention: Education, Support, Socialization and Activity      Discipline Responsible: [address] [place] [address]      Signature:  Harris Cowan

## 2020-12-21 NOTE — PROGRESS NOTES
SW met with treatment team to discuss pt's progress and setbacks. SW 2 was present. Pt reportedly slept well last night, without medications, appetite is good, attends scheduled group activities, social with peers/staff, performs ADL's, compliant with medications, behavior has been calm/cooperative, reports mild depression, severe anxiety r/t discharge, denies SI/HI/AVH, will be discharged today, follow-up appointments will be scheduled.

## 2020-12-21 NOTE — PROGRESS NOTES
Pt discharging on this date. Pt denies SI, HI, and/or AVH at this time. Pt follow up appointments provided by  staff; Aye oByd, to be entered in to d/c follow up section. Pt to follow up with [address] [place] [address] on 12/22/20 at 2 PM to see Maldonado Rivera for intake/assessment and again on 12/30/20 at 4:30 PM with Dr. Medina Pickens for medication management. Both appointments by phone, pt was given the [facility] telehealth packet and instructions to complete prior to discharge. Collateral was obtained from pt's motehr on 12/20/20 and is reportedly returning to residence where she was staying prior to admission with sister.

## 2020-12-22 NOTE — CARE COORDINATION
Follow up call completed. Writer was able to speak with the patient. Patient did have questions regarding their discharge. Pt had questions regarding follow-up appointments. Pt was given information regarding follow-up appointments.

## 2021-02-15 ENCOUNTER — TELEPHONE (OUTPATIENT)
Dept: PSYCHIATRY | Age: 26
End: 2021-02-15

## 2021-02-15 NOTE — TELEPHONE ENCOUNTER
Received medication refill from [address] [id number] for provider Dr. Richard Escalante, patient is no longer under the care of this provider and medication refill was refused at this time. Patient was to see [facility] provider after discharge.

## 2021-02-24 RX ORDER — LITHIUM CARBONATE 150 MG/1
CAPSULE ORAL
Qty: 60 CAPSULE | Refills: 1 | OUTPATIENT
Start: 2021-02-24

## 2021-02-24 NOTE — TELEPHONE ENCOUNTER
Pt no longer under the care of Dr. Toni Lynn, medication refused at this time. Pt to see Dr Anitra Siemens at [facility] for medication management.

## 2021-07-31 ENCOUNTER — HOSPITAL ENCOUNTER (EMERGENCY)
Facility: HOSPITAL | Age: 26
Discharge: HOME | End: 2021-08-01
Attending: EMERGENCY MEDICINE | Admitting: EMERGENCY MEDICINE

## 2021-07-31 ENCOUNTER — APPOINTMENT (OUTPATIENT)
Dept: GENERAL RADIOLOGY | Facility: HOSPITAL | Age: 26
End: 2021-07-31

## 2021-07-31 DIAGNOSIS — B34.8 RHINOVIRUS INFECTION: ICD-10-CM

## 2021-07-31 DIAGNOSIS — N39.0 ACUTE UTI: Primary | ICD-10-CM

## 2021-07-31 LAB
ALBUMIN SERPL-MCNC: 4.1 G/DL (ref 3.5–5.2)
ALBUMIN/GLOB SERPL: 1.5 G/DL
ALP SERPL-CCNC: 42 U/L (ref 39–117)
ALT SERPL W P-5'-P-CCNC: 9 U/L (ref 1–33)
ANION GAP SERPL CALCULATED.3IONS-SCNC: 12.9 MMOL/L (ref 5–15)
AST SERPL-CCNC: 19 U/L (ref 1–32)
BACTERIA UR QL AUTO: ABNORMAL /HPF
BASOPHILS # BLD AUTO: 0.02 10*3/MM3 (ref 0–0.2)
BASOPHILS NFR BLD AUTO: 0.2 % (ref 0–1.5)
BILIRUB SERPL-MCNC: 0.6 MG/DL (ref 0–1.2)
BILIRUB UR QL STRIP: NEGATIVE
BUN SERPL-MCNC: 7 MG/DL (ref 6–20)
BUN/CREAT SERPL: 6.4 (ref 7–25)
CALCIUM SPEC-SCNC: 8.4 MG/DL (ref 8.6–10.5)
CHLORIDE SERPL-SCNC: 98 MMOL/L (ref 98–107)
CLARITY UR: ABNORMAL
CO2 SERPL-SCNC: 21.1 MMOL/L (ref 22–29)
COLOR UR: ABNORMAL
CREAT SERPL-MCNC: 1.09 MG/DL (ref 0.57–1)
DEPRECATED RDW RBC AUTO: 41.3 FL (ref 37–54)
EOSINOPHIL # BLD AUTO: 0 10*3/MM3 (ref 0–0.4)
EOSINOPHIL NFR BLD AUTO: 0 % (ref 0.3–6.2)
ERYTHROCYTE [DISTWIDTH] IN BLOOD BY AUTOMATED COUNT: 11.5 % (ref 12.3–15.4)
GFR SERPL CREATININE-BSD FRML MDRD: 73 ML/MIN/1.73
GLOBULIN UR ELPH-MCNC: 2.8 GM/DL
GLUCOSE SERPL-MCNC: 104 MG/DL (ref 65–99)
GLUCOSE UR STRIP-MCNC: NEGATIVE MG/DL
HCG SERPL QL: NEGATIVE
HCT VFR BLD AUTO: 36.3 % (ref 34–46.6)
HGB BLD-MCNC: 12.1 G/DL (ref 12–15.9)
HGB UR QL STRIP.AUTO: ABNORMAL
HYALINE CASTS UR QL AUTO: ABNORMAL /LPF
IMM GRANULOCYTES # BLD AUTO: 0.04 10*3/MM3 (ref 0–0.05)
IMM GRANULOCYTES NFR BLD AUTO: 0.4 % (ref 0–0.5)
KETONES UR QL STRIP: ABNORMAL
LEUKOCYTE ESTERASE UR QL STRIP.AUTO: ABNORMAL
LYMPHOCYTES # BLD AUTO: 0.94 10*3/MM3 (ref 0.7–3.1)
LYMPHOCYTES NFR BLD AUTO: 9.1 % (ref 19.6–45.3)
MCH RBC QN AUTO: 32.6 PG (ref 26.6–33)
MCHC RBC AUTO-ENTMCNC: 33.3 G/DL (ref 31.5–35.7)
MCV RBC AUTO: 97.8 FL (ref 79–97)
MONOCYTES # BLD AUTO: 0.52 10*3/MM3 (ref 0.1–0.9)
MONOCYTES NFR BLD AUTO: 5 % (ref 5–12)
NEUTROPHILS NFR BLD AUTO: 8.8 10*3/MM3 (ref 1.7–7)
NEUTROPHILS NFR BLD AUTO: 85.3 % (ref 42.7–76)
NITRITE UR QL STRIP: NEGATIVE
NRBC BLD AUTO-RTO: 0 /100 WBC (ref 0–0.2)
PH UR STRIP.AUTO: 6 [PH] (ref 5–8)
PLATELET # BLD AUTO: 223 10*3/MM3 (ref 140–450)
PMV BLD AUTO: 9.8 FL (ref 6–12)
POTASSIUM SERPL-SCNC: 3.1 MMOL/L (ref 3.5–5.2)
PROT SERPL-MCNC: 6.9 G/DL (ref 6–8.5)
PROT UR QL STRIP: ABNORMAL
RBC # BLD AUTO: 3.71 10*6/MM3 (ref 3.77–5.28)
RBC # UR: ABNORMAL /HPF
REF LAB TEST METHOD: ABNORMAL
SODIUM SERPL-SCNC: 132 MMOL/L (ref 136–145)
SP GR UR STRIP: 1.02 (ref 1–1.03)
SQUAMOUS #/AREA URNS HPF: ABNORMAL /HPF
UROBILINOGEN UR QL STRIP: ABNORMAL
WBC # BLD AUTO: 10.32 10*3/MM3 (ref 3.4–10.8)
WBC UR QL AUTO: ABNORMAL /HPF
YEAST URNS QL MICRO: ABNORMAL /HPF

## 2021-07-31 PROCEDURE — 80053 COMPREHEN METABOLIC PANEL: CPT | Performed by: EMERGENCY MEDICINE

## 2021-07-31 PROCEDURE — 85025 COMPLETE CBC W/AUTO DIFF WBC: CPT | Performed by: EMERGENCY MEDICINE

## 2021-07-31 PROCEDURE — 81001 URINALYSIS AUTO W/SCOPE: CPT | Performed by: EMERGENCY MEDICINE

## 2021-07-31 PROCEDURE — 0202U NFCT DS 22 TRGT SARS-COV-2: CPT | Performed by: EMERGENCY MEDICINE

## 2021-07-31 PROCEDURE — 99283 EMERGENCY DEPT VISIT LOW MDM: CPT

## 2021-07-31 PROCEDURE — 84703 CHORIONIC GONADOTROPIN ASSAY: CPT | Performed by: EMERGENCY MEDICINE

## 2021-07-31 PROCEDURE — 71045 X-RAY EXAM CHEST 1 VIEW: CPT

## 2021-07-31 PROCEDURE — 96365 THER/PROPH/DIAG IV INF INIT: CPT

## 2021-07-31 RX ORDER — ACETAMINOPHEN 500 MG
1000 TABLET ORAL ONCE
Status: COMPLETED | OUTPATIENT
Start: 2021-07-31 | End: 2021-07-31

## 2021-07-31 RX ADMIN — SODIUM CHLORIDE 1000 ML: 9 INJECTION, SOLUTION INTRAVENOUS at 22:59

## 2021-07-31 RX ADMIN — ACETAMINOPHEN 1000 MG: 500 TABLET, FILM COATED ORAL at 23:03

## 2021-08-01 VITALS
RESPIRATION RATE: 16 BRPM | HEIGHT: 72 IN | DIASTOLIC BLOOD PRESSURE: 71 MMHG | OXYGEN SATURATION: 98 % | BODY MASS INDEX: 22.89 KG/M2 | SYSTOLIC BLOOD PRESSURE: 123 MMHG | WEIGHT: 169 LBS | HEART RATE: 81 BPM | TEMPERATURE: 99.2 F

## 2021-08-01 LAB
B PARAPERT DNA SPEC QL NAA+PROBE: NOT DETECTED
B PERT DNA SPEC QL NAA+PROBE: NOT DETECTED
C PNEUM DNA NPH QL NAA+NON-PROBE: NOT DETECTED
FLUAV SUBTYP SPEC NAA+PROBE: NOT DETECTED
FLUBV RNA ISLT QL NAA+PROBE: NOT DETECTED
HADV DNA SPEC NAA+PROBE: NOT DETECTED
HCOV 229E RNA SPEC QL NAA+PROBE: NOT DETECTED
HCOV HKU1 RNA SPEC QL NAA+PROBE: NOT DETECTED
HCOV NL63 RNA SPEC QL NAA+PROBE: NOT DETECTED
HCOV OC43 RNA SPEC QL NAA+PROBE: NOT DETECTED
HMPV RNA NPH QL NAA+NON-PROBE: NOT DETECTED
HPIV1 RNA SPEC QL NAA+PROBE: NOT DETECTED
HPIV2 RNA SPEC QL NAA+PROBE: NOT DETECTED
HPIV3 RNA NPH QL NAA+PROBE: NOT DETECTED
HPIV4 P GENE NPH QL NAA+PROBE: NOT DETECTED
M PNEUMO IGG SER IA-ACNC: NOT DETECTED
RHINOVIRUS RNA SPEC NAA+PROBE: DETECTED
RSV RNA NPH QL NAA+NON-PROBE: NOT DETECTED
SARS-COV-2 RNA NPH QL NAA+NON-PROBE: NOT DETECTED

## 2021-08-01 PROCEDURE — 96365 THER/PROPH/DIAG IV INF INIT: CPT

## 2021-08-01 PROCEDURE — 25010000002 CEFTRIAXONE PER 250 MG: Performed by: EMERGENCY MEDICINE

## 2021-08-01 RX ORDER — CEPHALEXIN 500 MG/1
500 CAPSULE ORAL 3 TIMES DAILY
Qty: 21 CAPSULE | Refills: 0 | Status: SHIPPED | OUTPATIENT
Start: 2021-08-01

## 2021-08-01 RX ORDER — CEFTRIAXONE SODIUM 1 G/50ML
1 INJECTION, SOLUTION INTRAVENOUS ONCE
Status: COMPLETED | OUTPATIENT
Start: 2021-08-01 | End: 2021-08-01

## 2021-08-01 RX ADMIN — CEFTRIAXONE SODIUM 1 G: 1 INJECTION, SOLUTION INTRAVENOUS at 00:38

## 2021-08-01 NOTE — ED PROVIDER NOTES
EMERGENCY DEPARTMENT ENCOUNTER    Room Number:  18/18  Date of encounter:  8/1/2021  PCP: Provider, No Known  Historian: Patient      HPI:  Chief Complaint: Referred from the immediate care  A complete HPI/ROS/PMH/PSH/SH/FH are unobtainable due to: None    Context: Timothy Alarcon is a 26 y.o. female who presents to the ED c/o referred from immediate care for tachycardia.  Patient stated that she went to the immediate care center for Covid testing because her sister recently tested positive.  Patient states that she had no abnormal symptoms, and strictly went there for screening purposes.  She was found to be tachycardic and was referred after her Covid test was negative.    Patient states that she was not aware that she had a fever, she said no chills, no fatigue, no chest pain, cough, shortness of breath, abdominal pain, nausea vomiting diarrhea, vaginal discharge.      PAST MEDICAL HISTORY  Active Ambulatory Problems     Diagnosis Date Noted   • Pelvic pain in female 03/06/2017   • Complex ovarian cyst 03/06/2017     Resolved Ambulatory Problems     Diagnosis Date Noted   • No Resolved Ambulatory Problems     Past Medical History:   Diagnosis Date   • Ovarian cyst    • Pelvic pain    • Weight loss          PAST SURGICAL HISTORY  Past Surgical History:   Procedure Laterality Date   • COLONOSCOPY N/A 6/27/2017    NBIH.  No bx    • DIAGNOSTIC LAPAROSCOPY N/A 3/6/2017    Procedure: DIAGNOSTIC LAPAROSCOPY WITH DRAINAGE OF RIGHT OVARIAN CYST;  Surgeon: Marian Nettles MD;  Location: [facility] OR [facility];  Service:    • ENDOSCOPY N/A 6/27/2017    Normal. 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   • NO PAST SURGERIES     • WISDOM TOOTH EXTRACTION           FAMILY HISTORY  Family History   Problem Relation Age of Onset   • Ulcerative colitis Mother    • Irritable bowel syndrome Sister          SOCIAL HISTORY  Social History     Socioeconomic History   • Marital status: Single     Spouse name: Not on  file   • Number of children: Not on file   • Years of education: Not on file   • Highest education level: Not on file   Tobacco Use   • Smoking status: Never Smoker   • Smokeless tobacco: Never Used   Substance and Sexual Activity   • Alcohol use: Yes     Comment: socially   • Drug use: Yes     Types: Marijuana     Comment: SOCIAL   • Sexual activity: Defer         ALLERGIES  Tramadol        REVIEW OF SYSTEMS  Review of Systems     All systems reviewed and negative except for those discussed in HPI.       PHYSICAL EXAM    I have reviewed the triage vital signs and nursing notes.    ED Triage Vitals [07/31/21 2156]   Temp Heart Rate Resp BP SpO2   (!) 102.5 °F (39.2 °C) 84 18 136/73 98 %      Temp src Heart Rate Source Patient Position BP Location FiO2 (%)   Tympanic Monitor -- -- --       Physical Exam  GENERAL: Awake alert nontoxic-appearing  HENT: nares patent, mucous membranes moist  EYES: no scleral icterus  CV: Sinus tachycardia  RESPIRATORY: normal effort  ABDOMEN: soft  MUSCULOSKELETAL: no deformity  NEURO: alert, moves all extremities, follows commands  SKIN: warm, dry        LAB RESULTS  Recent Results (from the past 24 hour(s))   Comprehensive Metabolic Panel    Collection Time: 07/31/21 10:37 PM    Specimen: Blood   Result Value Ref Range    Glucose 104 (H) 65 - 99 mg/dL    BUN 7 6 - 20 mg/dL    Creatinine 1.09 (H) 0.57 - 1.00 mg/dL    Sodium 132 (L) 136 - 145 mmol/L    Potassium 3.1 (L) 3.5 - 5.2 mmol/L    Chloride 98 98 - 107 mmol/L    CO2 21.1 (L) 22.0 - 29.0 mmol/L    Calcium 8.4 (L) 8.6 - 10.5 mg/dL    Total Protein 6.9 6.0 - 8.5 g/dL    Albumin 4.10 3.50 - 5.20 g/dL    ALT (SGPT) 9 1 - 33 U/L    AST (SGOT) 19 1 - 32 U/L    Alkaline Phosphatase 42 39 - 117 U/L    Total Bilirubin 0.6 0.0 - 1.2 mg/dL    eGFR  African Amer 73 >60 mL/min/1.73    Globulin 2.8 gm/dL    A/G Ratio 1.5 g/dL    BUN/Creatinine Ratio 6.4 (L) 7.0 - 25.0    Anion Gap 12.9 5.0 - 15.0 mmol/L   hCG, Serum, Qualitative    Collection  Time: 07/31/21 10:37 PM    Specimen: Blood   Result Value Ref Range    HCG Qualitative Negative Negative   CBC Auto Differential    Collection Time: 07/31/21 10:37 PM    Specimen: Blood   Result Value Ref Range    WBC 10.32 3.40 - 10.80 10*3/mm3    RBC 3.71 (L) 3.77 - 5.28 10*6/mm3    Hemoglobin 12.1 12.0 - 15.9 g/dL    Hematocrit 36.3 34.0 - 46.6 %    MCV 97.8 (H) 79.0 - 97.0 fL    MCH 32.6 26.6 - 33.0 pg    MCHC 33.3 31.5 - 35.7 g/dL    RDW 11.5 (L) 12.3 - 15.4 %    RDW-SD 41.3 37.0 - 54.0 fl    MPV 9.8 6.0 - 12.0 fL    Platelets 223 140 - 450 10*3/mm3    Neutrophil % 85.3 (H) 42.7 - 76.0 %    Lymphocyte % 9.1 (L) 19.6 - 45.3 %    Monocyte % 5.0 5.0 - 12.0 %    Eosinophil % 0.0 (L) 0.3 - 6.2 %    Basophil % 0.2 0.0 - 1.5 %    Immature Grans % 0.4 0.0 - 0.5 %    Neutrophils, Absolute 8.80 (H) 1.70 - 7.00 10*3/mm3    Lymphocytes, Absolute 0.94 0.70 - 3.10 10*3/mm3    Monocytes, Absolute 0.52 0.10 - 0.90 10*3/mm3    Eosinophils, Absolute 0.00 0.00 - 0.40 10*3/mm3    Basophils, Absolute 0.02 0.00 - 0.20 10*3/mm3    Immature Grans, Absolute 0.04 0.00 - 0.05 10*3/mm3    nRBC 0.0 0.0 - 0.2 /100 WBC   Respiratory Panel PCR w/COVID-19(SARS-CoV-2) JJ/PELON/BARBY/PAD/COR/MAD/ROSS In-House, NP Swab in [facility], 3-4 HR TAT - Swab, Nasopharynx    Collection Time: 07/31/21 11:00 PM    Specimen: Nasopharynx; Swab   Result Value Ref Range    ADENOVIRUS, PCR Not Detected Not Detected    Coronavirus 229E Not Detected Not Detected    Coronavirus HKU1 Not Detected Not Detected    Coronavirus NL63 Not Detected Not Detected    Coronavirus OC43 Not Detected Not Detected    COVID19 Not Detected Not Detected - Ref. Range    Human Metapneumovirus Not Detected Not Detected    Human Rhinovirus/Enterovirus Detected (A) Not Detected    Influenza A PCR Not Detected Not Detected    Influenza B PCR Not Detected Not Detected    Parainfluenza Virus 1 Not Detected Not Detected    Parainfluenza Virus 2 Not Detected Not Detected    Parainfluenza Virus 3 Not  Detected Not Detected    Parainfluenza Virus 4 Not Detected Not Detected    RSV, PCR Not Detected Not Detected    Bordetella pertussis pcr Not Detected Not Detected    Bordetella parapertussis PCR Not Detected Not Detected    Chlamydophila pneumoniae PCR Not Detected Not Detected    Mycoplasma pneumo by PCR Not Detected Not Detected   Urinalysis With Microscopic If Indicated (No Culture) - Urine, Clean Catch    Collection Time: 07/31/21 11:00 PM    Specimen: Urine, Clean Catch   Result Value Ref Range    Color, UA Orange (A) Yellow, Straw    Appearance, UA Cloudy (A) Clear    pH, UA 6.0 5.0 - 8.0    Specific Gravity, UA 1.016 1.005 - 1.030    Glucose, UA Negative Negative    Ketones, UA 40 mg/dL (2+) (A) Negative    Bilirubin, UA Negative Negative    Blood, UA Large (3+) (A) Negative    Protein,  mg/dL (2+) (A) Negative    Leuk Esterase, UA Large (3+) (A) Negative    Nitrite, UA Negative Negative    Urobilinogen, UA 1.0 E.U./dL 0.2 - 1.0 E.U./dL   Urinalysis, Microscopic Only - Urine, Clean Catch    Collection Time: 07/31/21 11:00 PM    Specimen: Urine, Clean Catch   Result Value Ref Range    RBC, UA 0-2 None Seen, 0-2 /HPF    WBC, UA Too Numerous to Count (A) None Seen, 0-2 /HPF    Bacteria, UA 3+ (A) None Seen /HPF    Squamous Epithelial Cells, UA 7-12 (A) None Seen, 0-2 /HPF    Yeast, UA Small/1+ Yeast None Seen /HPF    Hyaline Casts, UA 0-2 None Seen /LPF    Methodology Manual Light Microscopy        Ordered the above labs and independently reviewed the results.        RADIOLOGY  XR Chest 1 View    Result Date: 7/31/2021  Patient: LYNNE GRIGSBY  Time Out: 23:39 Exam(s): FILM CXR 1 VIEW EXAM:   XR Chest, 1 View CLINICAL HISTORY:    Reason for exam: fever. TECHNIQUE:   Frontal view of the chest. COMPARISON:   No relevant prior studies available. FINDINGS:   Lungs:  Subtle opacity at the left base potentially representing a small pneumonia in the appropriate clinical setting.   Pleural space:  No pleural  effusion. No pneumothorax.   Heart:  Unremarkable.  No cardiomegaly. IMPRESSION:       Subtle opacity at the left base potentially representing a small pneumonia in the appropriate clinical setting.     Electronically signed by Ernie Mejía MD on 07-31-21 at 2339      I ordered the above noted radiological studies. Reviewed by me and discussed with radiologist.  See dictation for official radiology interpretation.      PROCEDURES    Procedures      MEDICATIONS GIVEN IN ER    Medications   sodium chloride 0.9 % bolus 1,000 mL (0 mL Intravenous Stopped 8/1/21 0127)   acetaminophen (TYLENOL) tablet 1,000 mg (1,000 mg Oral Given 7/31/21 2303)   cefTRIAXone (ROCEPHIN) IVPB 1 g (0 g Intravenous Stopped 8/1/21 0127)         PROGRESS, DATA ANALYSIS, CONSULTS, AND MEDICAL DECISION MAKING    All labs have been independently reviewed by me.  All radiology studies have been reviewed by me and discussed with radiologist dictating the report.   EKG's independently viewed and interpreted by me.  Discussion below represents my analysis of pertinent findings related to patient's condition, differential diagnosis, treatment plan and final disposition.        ED Course as of Aug 01 0215   Sun Aug 01, 2021   0214 CBC unremarkable, chemistry unremarkable except for potassium 3.1    [DP]   0214 Urinalysis shows obvious evidence of UTI  Respiratory viral panel positive for human rhinovirus    [DP]   0214 Patient was given IV fluids and Rocephin, and she is hemodynamically stable improved and ready for discharge    [DP]      ED Course User Index  [DP] Doron Olivera MD         Patient wore surgical mask, I had on an N95, face shield, gown and gloves for the entire interaction    AS OF 02:15 EDT VITALS:    BP - 123/71  HR - 81  TEMP - 99.2 °F (37.3 °C) (Oral)  O2 SATS - 98%        DIAGNOSIS  Final diagnoses:   Acute UTI   Rhinovirus infection         DISPOSITION  Discharge           Doron Olivera MD  08/01/21 0215

## 2021-08-01 NOTE — ED NOTES
pt to ER via [facility] from [facility] sent here for elevated HR . pt went there to be tested for Covid due to exposure. pt tested negative there but was sent here for elevated HR  Pt in mask in triage  Triage in appropriate PPE     Jennyfer Saul RN  07/31/21 0258

## 2022-04-10 ENCOUNTER — E-VISIT (OUTPATIENT)
Dept: ADMINISTRATIVE | Facility: OTHER | Age: 27
End: 2022-04-10

## 2022-04-10 NOTE — E-VISIT ESCALATED
Chief Complaint: Yeast infection   Patient was shown the following escalation message:   Some conditions need a visit with a healthcare provider   We need more information about the unexpected vaginal spotting or bleeding you're having. You should speak with a provider to get care.   ----------   Patient Interview Transcript:   Which of these symptoms are bothering you? Scroll to see all options. Select all that apply.    Vaginal discharge that looks different than usual   Not selected:    Itching around my vagina    Itching in my vagina    Burning around my vagina    Burning in my vagina    Fishy-smelling vaginal discharge    Pain during sex    Burning with urination   How long have you had these symptoms? Select one.    1 to 3 days   Not selected:    Less than 24 hours    4 to 7 days    More than 7 days   How would you describe your vaginal discharge? Select one.    None of the above   Not selected:    Thick and clumpy, like cottage cheese    Thin    Frothy or foamy   What color is your vaginal discharge? Select one.    None of the above   Not selected:    White    Gray or off-white    Yellow    Green   Do you have any vaginal dryness? Select one.    No   Not selected:    Yes   Have you had any unexpected vaginal bleeding or spotting? By unexpected, we mean either between your normal periods or after you've gone through menopause. Select one.    Yes   Not selected:    No   Is there any redness, swelling, or darkening of the skin in or around your vagina? Select all that apply.    I'm not sure   Not selected:    Redness    Swelling    Darkening of the skin    None of the above   Do you have any pain in or around your vagina? Select one.    No   Not selected:    Yes   Since your symptoms started, have you used a vaginal health screening kit to check the acidity (pH) of your vaginal discharge? These kits are available without a prescription at many drug stores and pharmacies. Common brands include Monistat  Complete Care Vaginal Health Test and Vagisil Screening Kit. Select one.    No   Not selected:    Yes   Have you noticed any sores on your genital area? This includes blisters or ulcers. Select one.    No   Not selected:    Yes   Along with your vaginal symptoms, have you had any of these symptoms? Select all that apply.    None of the above   Not selected:    Fever    Vomiting    Abdominal (stomach) pain   Before your symptoms began, did you have an injury to your genital area or vagina? Select one.    No   Not selected:    Yes   Could an object like a tampon or condom be stuck inside your vagina? Select one.    No   Not selected:    Yes   In the 2 weeks before your symptoms began, did you use any douching products? Select one.    No   Not selected:    Yes   In the 2 weeks before your symptoms began, did you use any products containing nonoxynol-9? Nonoxynol-9 is a spermicide commonly found in birth control products, including condoms, sponges, vaginal films, and jellies. Select one.    No   Not selected:    Yes   In the week before your symptoms started, did any of these come into contact with your genital area? Scroll to see all options. Select all that apply.    None of the above   Not selected:    A new soap    Underwear washed with a new laundry detergent    A new sex toy    A new cream or lotion    A new medication    A new perfume    A new feminine or flushable wipe    Other (specify)   Have you had a yeast infection before? Select one.    Yes, and my current symptoms feel the same as before   Not selected:    Yes, but my current symptoms feel different than before    No   How many yeast infections have you had in the last 12 months? Select one.    0   Not selected:    1 to 3    4 or more    I'm not sure   Have you ever been treated for bacterial vaginosis (BV)? Select one.    Yes   Not selected:    No   Compared to the last time you were treated for BV, how do your current symptoms feel? Select one.    The  same   Not selected:    Different   In the last 12 months, how many times have you been treated for BV? Select one.    0   Not selected:    1 to 3    4 or more    I'm not sure   In the past, have you developed yeast infections as a result of taking oral antibiotics? Select one.    No   Not selected:    Yes   Were you sexually active at any time in the last 3 months? Select one.    Yes   Not selected:    No   Have you had sex with a new partner in the last 2 weeks? Select one.    Yes   Not selected:    No   Have you and your new partner had vaginal sex without a condom? Unprotected sex can put you at risk for sexually transmitted infections. Select one.    No   Not selected:    Yes   Have you had sex with 3 or more partners in the last 3 months? Select one.    No   Not selected:    Yes   In the last 3 months, were you treated for a sexually transmitted infection (STI)? Examples of STIs include chlamydia, gonorrhea, trichomoniasis (trich), herpes, or syphilis. Select one.    No   Not selected:    Yes   Has your sexual partner(s) recently tested positive for a sexually transmitted infection (STI)? Select all that apply.    No, not that I know of   Not selected:    Yes, chlamydia    Yes, gonorrhea    Yes, trichomoniasis (trich)    Other (specify)   Are you pregnant? Select one.    No   Not selected:    Yes   When was your last menstrual period? If you don't currently have periods or no longer have periods, please briefly explain.    I actually don't have a yeast infection or symptoms. My problem is that I have a nipple piercing infection. My right breast is in an excruciating amount of pain and my nipple is so sensitive to the tough and just burns with pain. The pain has traveled up to my armpit and my lymph nodes are swollen there. I have pus coming out of my nipple and my nipple is very swollen. I need an antibiotic to clear this up. There isn't an option for Breast/nipple pain so I had to pick something   Are you  "breastfeeding? Select one.    No   Not selected:    Yes   Did your symptoms start before, during, or after your menstrual period? Select one.    I'm not sure   Not selected:    Before    During    After    I don't currently have periods or no longer have periods   Have you recently had abdominal or pelvic surgery? Select one.    No   Not selected:    Yes, within the last month    Yes, within the last 3 months   Are you currently being treated for Type 1 or Type 2 diabetes? Select one.    No   Not selected:    Yes   Do you have any of these conditions that can affect the immune system? Scroll to see all options. Select all that apply.    None of these   Not selected:    History of bone marrow transplant    Chronic kidney disease    Chronic liver disease (including cirrhosis)    HIV/AIDS    Inflammatory bowel disease (Crohn's disease or ulcerative colitis)    Lupus    Moderate to severe plaque psoriasis    Multiple sclerosis    Rheumatoid arthritis    Sickle cell anemia    Alpha or beta thalassemia    History of solid organ transplant (kidney, liver, or heart)    History of spleen removal    An autoimmune disorder not listed here    A condition requiring treatment with long-term use of oral steroids (such as prednisone, prednisolone, or dexamethasone)   Have you ever been diagnosed with cancer? Select one.    No   Not selected:    Yes, I have cancer now    Yes, but I'm in remission   Have you been treated for antibiotic-associated colitis in the last month? This is also called \"C. diff colitis.\" It's commonly caused by the bacteria Clostridium difficile. Select one.    No   Not selected:    Yes   Do you smoke tobacco? Select one.    No, never   Not selected:    Yes, every day    Yes, some days    No, I quit   Have you tried any of these over-the-counter treatments for your current symptoms? Select all that apply.    I haven't tried anything for my symptoms   Not selected:    Vaginal cream, ointment, or suppository for " yeast infection    Anti-itch cream or ointment containing hydrocortisone    Vaginal wash    Vaginal wipes, such as Summer's Monique    Homeopathic treatment    Other (specify)   Do you take or use any of these medications containing estrogen or progesterone? Select one.    None of the above   Not selected:    Birth control pills    Hormonal intrauterine device (IUD)    Contraceptive patch    Vaginal ring, such as NuvaRing    Birth control shot, such as Depo-Provera    Hormone replacement therapy (HRT), such as oral and topical medication, vaginal ring, and transdermal patch   ----------   Medical history   Medical history data does not currently exist for this patient.

## 2022-04-10 NOTE — E-VISIT ESCALATED
Chief Complaint: Rashes and other skin conditions   Patient was shown the following escalation message:   Some conditions need a visit with a healthcare provider   We need to see photos of your skin to provide the best care. Since you'd rather not send photos, you should speak with a provider to get care.   ----------   Patient Interview Transcript:   Where is your rash located? Select all that apply.    Chest   Not selected:    Scalp    Face    Inside mouth or on lips    Neck    Arm    Hand    Stomach    Back    Buttocks    Groin    Leg    Foot or in between toes   Before you got the rash, did you have any of these exposures or possible triggers? Select all that apply.    No, not that I know of   Not selected:    Tick bite    Other insect bite or sting in the area of the rash    Cut, scrape, or other skin injury in the area of the rash    Contact with poison oak, poison ivy, or poison sumac in the area of the rash    Contact with a new soap, perfume, skincare product, cleaning product, or other chemical in the area of the rash    Wearing new jewelry, belt buckle, or other metal accessory in the area of the rash    Taking a new medication, supplement, or herb    Consuming a new food or drink    Vaccine injection    Exposure to extreme hot or cold temperatures    Exercising intensely    Sitting in a hot tub or swimming in a heated swimming pool   Is the rash on one or both sides of your body? Select one.    One side only   Not selected:    Both sides   Have you had close contact with anyone who has scabies? Scabies is an itchy, highly contagious skin condition caused by a tiny mite. Select one.    No, not that I know of   Not selected:    Yes   Have you been around anyone with impetigo? Impetigo is a highly contagious skin infection. Select one.    No, not that I know of   Not selected:    Yes   Have you ever had chickenpox? Select one.    Yes   Not selected:    No    I'm not sure   Is the rash in an area that you  shave regularly? Select one.    No   Not selected:    Yes   Does the appearance or location of the rash change throughout the course of a day? For example, does it appear on one part of your body in the morning, disappear completely after several hours, and then reappear on a different part of your body? Select one.    No   Not selected:    Yes   Since the rash first appeared, has it spread or shown up in new locations? This includes covering a larger area than it first did, or spreading to another part of the body. Select one.    No   Not selected:    Yes   Which of these describe your rash? Select all that apply.    None of the above   Not selected:    Itchy    Painful    Warmer than other areas of my skin   How long have you had these current rash symptoms? Select one.    2 to 3 days   Not selected:    Less than 24 hours    24 to 48 hours    3 to 5 days    5 to 7 days    1 to 2 weeks    More than 2 weeks   Do any of these make the rash worse? Select all that apply.    None of the above   Not selected:    Alcohol    Exercise    Exposure to very hot or very cold temperature    Certain foods    Changes in weather    , soaps, or detergents    Hot beverages    Spicy foods    Stress or strong emotions (such as anger or embarrassment)    Sun exposure    Sweat    Wool in clothing or blankets   We need to see photos of the rash. Photos help us make a diagnosis and recommend the right treatment. If you choose not to send photos, you may need to speak to a provider to get care.    No, I'd rather not send photos   Not selected:    OK, I'll send photos   ----------   Medical history   Medical history data does not currently exist for this patient.

## 2022-04-11 ENCOUNTER — TELEMEDICINE (OUTPATIENT)
Dept: FAMILY MEDICINE CLINIC | Facility: TELEHEALTH | Age: 27
End: 2022-04-11

## 2022-04-11 DIAGNOSIS — L03.818 CELLULITIS OF OTHER SPECIFIED SITE: Primary | ICD-10-CM

## 2022-04-11 PROCEDURE — 99213 OFFICE O/P EST LOW 20 MIN: CPT | Performed by: NURSE PRACTITIONER

## 2022-04-11 RX ORDER — MUPIROCIN CALCIUM 20 MG/G
1 CREAM TOPICAL 3 TIMES DAILY
Qty: 30 G | Refills: 0 | Status: SHIPPED | OUTPATIENT
Start: 2022-04-11

## 2022-04-11 RX ORDER — AMOXICILLIN AND CLAVULANATE POTASSIUM 875; 125 MG/1; MG/1
1 TABLET, FILM COATED ORAL 2 TIMES DAILY
Qty: 20 TABLET | Refills: 0 | Status: SHIPPED | OUTPATIENT
Start: 2022-04-11

## 2022-04-11 RX ORDER — IBUPROFEN 600 MG/1
600 TABLET ORAL EVERY 6 HOURS PRN
Qty: 60 TABLET | Refills: 0 | Status: SHIPPED | OUTPATIENT
Start: 2022-04-11

## 2022-04-11 NOTE — PROGRESS NOTES
You have chosen to receive care through a telehealth visit.  Do you consent to use a video/audio connection for your medical care today? Yes     CHIEF COMPLAINT  Chief Complaint   Patient presents with   • Breast Problem     Redness and swelling due to a piercing.          HPI  Timothy Alarcon is a 27 y.o. female  presents with complaint of recent injury to her right nipple after she had a nipple piercing. She reports she pulled it on a suit case soon after she had it pierced and it is now sore to touch, red and mildly swollen. She has noticed pus coming from it the last 2 days. She is soaking it in epson salt water and keeping it clean. She reports no fever. She does report right axillary lymph node swelling and tenderness.     Review of Systems   Constitutional: Negative.    HENT: Negative.    Respiratory: Negative.    Cardiovascular: Negative.    Gastrointestinal: Negative.    Skin: Positive for color change and wound.        Right Nipple pain and swelling with redness and tenderness   Hematological: Positive for adenopathy (lymph node swelling of right axillary nodes. ).   Psychiatric/Behavioral: Negative.        Past Medical History:   Diagnosis Date   • Ovarian cyst    • Pelvic pain    • Weight loss        Family History   Problem Relation Age of Onset   • Ulcerative colitis Mother    • Irritable bowel syndrome Sister        Social History     Socioeconomic History   • Marital status: Single   Tobacco Use   • Smoking status: Never Smoker   • Smokeless tobacco: Never Used   Substance and Sexual Activity   • Alcohol use: Yes     Comment: socially   • Drug use: Yes     Types: Marijuana     Comment: SOCIAL   • Sexual activity: Defer       Timothy Alarcon  reports that she has never smoked. She has never used smokeless tobacco..         There were no vitals taken for this visit.    PHYSICAL EXAM  Physical Exam   Constitutional: She is oriented to person, place, and time. She appears well-developed and well-nourished.  She does not have a sickly appearance. She does not appear ill. No distress.   HENT:   Head: Normocephalic and atraumatic.   Pulmonary/Chest: Effort normal.  No respiratory distress.  Neurological: She is alert and oriented to person, place, and time.   Skin: Skin is intact. There is erythema.   Right nipple with redness and swelling no drainage or exudate seen.    Psychiatric: She has a normal mood and affect.       Results for orders placed or performed during the hospital encounter of 07/31/21   Respiratory Panel PCR w/COVID-19(SARS-CoV-2) [facility] In-House, NP Swab in UTM/VTM, 3-4 HR TAT - Swab, Nasopharynx    Specimen: Nasopharynx; Swab   Result Value Ref Range    ADENOVIRUS, PCR Not Detected Not Detected    Coronavirus 229E Not Detected Not Detected    Coronavirus HKU1 Not Detected Not Detected    Coronavirus NL63 Not Detected Not Detected    Coronavirus OC43 Not Detected Not Detected    COVID19 Not Detected Not Detected - Ref. Range    Human Metapneumovirus Not Detected Not Detected    Human Rhinovirus/Enterovirus Detected (A) Not Detected    Influenza A PCR Not Detected Not Detected    Influenza B PCR Not Detected Not Detected    Parainfluenza Virus 1 Not Detected Not Detected    Parainfluenza Virus 2 Not Detected Not Detected    Parainfluenza Virus 3 Not Detected Not Detected    Parainfluenza Virus 4 Not Detected Not Detected    RSV, PCR Not Detected Not Detected    Bordetella pertussis pcr Not Detected Not Detected    Bordetella parapertussis PCR Not Detected Not Detected    Chlamydophila pneumoniae PCR Not Detected Not Detected    Mycoplasma pneumo by PCR Not Detected Not Detected   Comprehensive Metabolic Panel    Specimen: Blood   Result Value Ref Range    Glucose 104 (H) 65 - 99 mg/dL    BUN 7 6 - 20 mg/dL    Creatinine 1.09 (H) 0.57 - 1.00 mg/dL    Sodium 132 (L) 136 - 145 mmol/L    Potassium 3.1 (L) 3.5 - 5.2 mmol/L    Chloride 98 98 - 107 mmol/L    CO2 21.1 (L) 22.0 - 29.0 mmol/L     Calcium 8.4 (L) 8.6 - 10.5 mg/dL    Total Protein 6.9 6.0 - 8.5 g/dL    Albumin 4.10 3.50 - 5.20 g/dL    ALT (SGPT) 9 1 - 33 U/L    AST (SGOT) 19 1 - 32 U/L    Alkaline Phosphatase 42 39 - 117 U/L    Total Bilirubin 0.6 0.0 - 1.2 mg/dL    eGFR  African Amer 73 >60 mL/min/1.73    Globulin 2.8 gm/dL    A/G Ratio 1.5 g/dL    BUN/Creatinine Ratio 6.4 (L) 7.0 - 25.0    Anion Gap 12.9 5.0 - 15.0 mmol/L   hCG, Serum, Qualitative    Specimen: Blood   Result Value Ref Range    HCG Qualitative Negative Negative   Urinalysis With Microscopic If Indicated (No Culture) - Urine, Clean Catch    Specimen: Urine, Clean Catch   Result Value Ref Range    Color, UA Orange (A) Yellow, Straw    Appearance, UA Cloudy (A) Clear    pH, UA 6.0 5.0 - 8.0    Specific Gravity, UA 1.016 1.005 - 1.030    Glucose, UA Negative Negative    Ketones, UA 40 mg/dL (2+) (A) Negative    Bilirubin, UA Negative Negative    Blood, UA Large (3+) (A) Negative    Protein,  mg/dL (2+) (A) Negative    Leuk Esterase, UA Large (3+) (A) Negative    Nitrite, UA Negative Negative    Urobilinogen, UA 1.0 E.U./dL 0.2 - 1.0 E.U./dL   CBC Auto Differential    Specimen: Blood   Result Value Ref Range    WBC 10.32 3.40 - 10.80 10*3/mm3    RBC 3.71 (L) 3.77 - 5.28 10*6/mm3    Hemoglobin 12.1 12.0 - 15.9 g/dL    Hematocrit 36.3 34.0 - 46.6 %    MCV 97.8 (H) 79.0 - 97.0 fL    MCH 32.6 26.6 - 33.0 pg    MCHC 33.3 31.5 - 35.7 g/dL    RDW 11.5 (L) 12.3 - 15.4 %    RDW-SD 41.3 37.0 - 54.0 fl    MPV 9.8 6.0 - 12.0 fL    Platelets 223 140 - 450 10*3/mm3    Neutrophil % 85.3 (H) 42.7 - 76.0 %    Lymphocyte % 9.1 (L) 19.6 - 45.3 %    Monocyte % 5.0 5.0 - 12.0 %    Eosinophil % 0.0 (L) 0.3 - 6.2 %    Basophil % 0.2 0.0 - 1.5 %    Immature Grans % 0.4 0.0 - 0.5 %    Neutrophils, Absolute 8.80 (H) 1.70 - 7.00 10*3/mm3    Lymphocytes, Absolute 0.94 0.70 - 3.10 10*3/mm3    Monocytes, Absolute 0.52 0.10 - 0.90 10*3/mm3    Eosinophils, Absolute 0.00 0.00 - 0.40 10*3/mm3     Basophils, Absolute 0.02 0.00 - 0.20 10*3/mm3    Immature Grans, Absolute 0.04 0.00 - 0.05 10*3/mm3    nRBC 0.0 0.0 - 0.2 /100 WBC   Urinalysis, Microscopic Only - Urine, Clean Catch    Specimen: Urine, Clean Catch   Result Value Ref Range    RBC, UA 0-2 None Seen, 0-2 /HPF    WBC, UA Too Numerous to Count (A) None Seen, 0-2 /HPF    Bacteria, UA 3+ (A) None Seen /HPF    Squamous Epithelial Cells, UA 7-12 (A) None Seen, 0-2 /HPF    Yeast, UA Small/1+ Yeast None Seen /HPF    Hyaline Casts, UA 0-2 None Seen /LPF    Methodology Manual Light Microscopy        Diagnoses and all orders for this visit:    1. Cellulitis of other specified site (Primary)  -     amoxicillin-clavulanate (Augmentin) 875-125 MG per tablet; Take 1 tablet by mouth 2 (Two) Times a Day.  Dispense: 20 tablet; Refill: 0  -     mupirocin (Bactroban) 2 % cream; Apply 1 application topically to the appropriate area as directed 3 (Three) Times a Day.  Dispense: 30 g; Refill: 0  -     ibuprofen (ADVIL,MOTRIN) 600 MG tablet; Take 1 tablet by mouth Every 6 (Six) Hours As Needed for Mild Pain .  Dispense: 60 tablet; Refill: 0    keep area clean and dry.   Continue to soak in warm epsom salt water.      FOLLOW-UP  As discussed during visit with PCP/[facility] Care if no improvement or Urgent Care/Emergency Department if worsening of symptoms    Patient verbalizes understanding of medication dosage, comfort measures, instructions for treatment and follow-up.    Bella Ratliff, CONNIE  04/11/2022  08:11 EDT    This visit was performed via Telehealth.  This patient has been instructed to follow-up with their primary care provider if their symptoms worsen or the treatment provided does not resolve their illness.

## 2023-10-03 NOTE — PROGRESS NOTES
[facility] Daily Shift Assessment  Nursing Progress Note    Room: 0608/608-01 Name: Jose C Yanez Age: 31 y.o. Gender: female   Dx: Bipolar 1 disorder, manic, moderate (HCC)  Precautions: suicide risk  Target Symptoms:   Accu-Chek: NoSleep: Yes,Sleep Quality Good SI No AV[facility] denies [address] [place] [address]  ADLs: Yes Speech: normal Depression: 0 Anxiety: 1   Participation LevelActive Listener and Interactive  Appetite: Good  Respiratory symptoms: No Headache: No Body aches: No Fever: No Cough: No  Patients encouraged to wear masks, wash hands frequently and practice social distancing while on the unit: Yes  Visitation: No due to covid 19  Participation QualityAppropriate, Attentive, Sharing and Supportive    Notes: alert and oriented x4. Wearing casual attire. Appearance is clean and appropriate. Well groomed. Affect is congruent. Patient is pleasant, calm and cooperative. Social and attending groups. Compliant with medications. Patient states \"I am feeling better. \" reports good sleep and good appetite.      Signature: Electronically signed by Holger Cordova RN on 12/18/20 at 2:41 PM CST Consent (Scalp)/Introductory Paragraph: The rationale for Mohs was explained to the patient and consent was obtained. The risks, benefits and alternatives to therapy were discussed in detail. Specifically, the risks of changes in hair growth pattern secondary to repair, infection, scarring, bleeding, prolonged wound healing, incomplete removal, allergy to anesthesia, nerve injury and recurrence were addressed. Prior to the procedure, the treatment site was clearly identified and confirmed by the patient. All components of Universal Protocol/PAUSE Rule completed.
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
## (undated) DEVICE — 2, DISPOSABLE SUCTION/IRRIGATOR WITH DISPOSABLE TIP: Brand: STRYKEFLOW

## (undated) DEVICE — PK LAP GYN TOWER 40

## (undated) DEVICE — SINGLE-USE BIOPSY FORCEPS: Brand: RADIAL JAW 4

## (undated) DEVICE — SUT VIC 0 TN 27IN DYED JTN0G

## (undated) DEVICE — ENDOCUT SCISSOR TIP, DISPOSABLE: Brand: RENEW

## (undated) DEVICE — CANNULA,ADULT,SOFT-TOUCH,7TUBE,SC: Brand: MEDLINE

## (undated) DEVICE — GLV SURG TRIUMPH CLASSIC PF LTX 7 STRL

## (undated) DEVICE — ENDOPATH XCEL BLADELESS TROCARS WITH STABILITY SLEEVES: Brand: ENDOPATH XCEL

## (undated) DEVICE — GLV SURG SENSICARE MICRO PF LF 6.5 STRL

## (undated) DEVICE — UNDYED BRAIDED (POLYGLACTIN 910), SYNTHETIC ABSORBABLE SUTURE: Brand: COATED VICRYL

## (undated) DEVICE — TUBING, SUCTION, 1/4" X 10', STRAIGHT: Brand: MEDLINE

## (undated) DEVICE — THE TORRENT IRRIGATION SCOPE CONNECTOR IS USED WITH THE TORRENT IRRIGATION TUBING TO PROVIDE IRRIGATION FLUIDS SUCH AS STERILE WATER DURING GASTROINTESTINAL ENDOSCOPIC PROCEDURES WHEN USED IN CONJUNCTION WITH AN IRRIGATION PUMP (OR ELECTROSURGICAL UNIT).: Brand: TORRENT

## (undated) DEVICE — Device: Brand: DEFENDO AIR/WATER/SUCTION AND BIOPSY VALVE

## (undated) DEVICE — BITEBLOCK OMNI BLOC